# Patient Record
Sex: FEMALE | Race: WHITE | Employment: UNEMPLOYED | URBAN - NONMETROPOLITAN AREA
[De-identification: names, ages, dates, MRNs, and addresses within clinical notes are randomized per-mention and may not be internally consistent; named-entity substitution may affect disease eponyms.]

---

## 2017-01-16 ENCOUNTER — OFFICE VISIT (OUTPATIENT)
Dept: OTHER | Age: 22
End: 2017-01-16

## 2017-01-16 VITALS
WEIGHT: 157 LBS | DIASTOLIC BLOOD PRESSURE: 62 MMHG | BODY MASS INDEX: 24.64 KG/M2 | HEART RATE: 72 BPM | SYSTOLIC BLOOD PRESSURE: 112 MMHG | HEIGHT: 67 IN

## 2017-01-16 DIAGNOSIS — E10.8 TYPE 1 DIABETES MELLITUS WITH COMPLICATION, WITH LONG-TERM CURRENT USE OF INSULIN (HCC): Primary | ICD-10-CM

## 2017-01-16 LAB — HBA1C MFR BLD: 8 % (ref 4.3–5.7)

## 2017-01-16 PROCEDURE — 83036 HEMOGLOBIN GLYCOSYLATED A1C: CPT | Performed by: REGISTERED NURSE

## 2017-01-16 PROCEDURE — 99211 OFF/OP EST MAY X REQ PHY/QHP: CPT | Performed by: REGISTERED NURSE

## 2017-02-15 ENCOUNTER — TELEPHONE (OUTPATIENT)
Dept: OTHER | Age: 22
End: 2017-02-15

## 2017-02-22 ENCOUNTER — OFFICE VISIT (OUTPATIENT)
Dept: ENDOCRINOLOGY | Age: 22
End: 2017-02-22

## 2017-02-22 VITALS
BODY MASS INDEX: 24.4 KG/M2 | HEART RATE: 81 BPM | RESPIRATION RATE: 18 BRPM | HEIGHT: 67 IN | DIASTOLIC BLOOD PRESSURE: 58 MMHG | WEIGHT: 155.5 LBS | SYSTOLIC BLOOD PRESSURE: 121 MMHG

## 2017-02-22 DIAGNOSIS — E10.65 TYPE 1 DIABETES MELLITUS WITH HYPERGLYCEMIA (HCC): Primary | ICD-10-CM

## 2017-02-22 PROCEDURE — 99214 OFFICE O/P EST MOD 30 MIN: CPT | Performed by: CLINICAL NURSE SPECIALIST

## 2017-02-22 ASSESSMENT — ENCOUNTER SYMPTOMS
NAUSEA: 0
COUGH: 0
WHEEZING: 0
CONSTIPATION: 0
CHEST TIGHTNESS: 0
VOICE CHANGE: 0
ABDOMINAL PAIN: 0
SHORTNESS OF BREATH: 0
EYE REDNESS: 0
DIARRHEA: 0

## 2017-04-04 RX ORDER — GLUCOSAM/CHON-MSM1/C/MANG/BOSW 500-416.6
TABLET ORAL
Qty: 200 EACH | Refills: 5 | Status: SHIPPED | OUTPATIENT
Start: 2017-04-04 | End: 2017-07-31 | Stop reason: CLARIF

## 2017-04-19 ENCOUNTER — TELEPHONE (OUTPATIENT)
Dept: ENDOCRINOLOGY | Age: 22
End: 2017-04-19

## 2017-04-24 ENCOUNTER — OFFICE VISIT (OUTPATIENT)
Dept: OTHER | Age: 22
End: 2017-04-24

## 2017-04-24 VITALS — WEIGHT: 154 LBS | BODY MASS INDEX: 24.17 KG/M2

## 2017-04-24 DIAGNOSIS — E10.9 TYPE 1 DIABETES MELLITUS WITHOUT COMPLICATION (HCC): Primary | ICD-10-CM

## 2017-05-18 ENCOUNTER — OFFICE VISIT (OUTPATIENT)
Dept: ENDOCRINOLOGY | Age: 22
End: 2017-05-18

## 2017-05-18 VITALS
HEART RATE: 84 BPM | WEIGHT: 152.5 LBS | HEIGHT: 67 IN | RESPIRATION RATE: 18 BRPM | DIASTOLIC BLOOD PRESSURE: 68 MMHG | BODY MASS INDEX: 23.93 KG/M2 | SYSTOLIC BLOOD PRESSURE: 133 MMHG

## 2017-05-18 DIAGNOSIS — E10.65 TYPE 1 DIABETES MELLITUS WITH HYPERGLYCEMIA (HCC): Primary | ICD-10-CM

## 2017-05-18 PROCEDURE — 99214 OFFICE O/P EST MOD 30 MIN: CPT | Performed by: CLINICAL NURSE SPECIALIST

## 2017-05-18 ASSESSMENT — ENCOUNTER SYMPTOMS
SHORTNESS OF BREATH: 0
NAUSEA: 0
CHEST TIGHTNESS: 0
DIARRHEA: 0
CONSTIPATION: 0
ABDOMINAL PAIN: 0
COUGH: 0
VOICE CHANGE: 0
WHEEZING: 0
EYE REDNESS: 0

## 2017-06-26 ENCOUNTER — TELEPHONE (OUTPATIENT)
Dept: OTHER | Age: 22
End: 2017-06-26

## 2017-07-24 RX ORDER — LISINOPRIL 2.5 MG/1
TABLET ORAL
Qty: 30 TABLET | Refills: 3 | Status: SHIPPED | OUTPATIENT
Start: 2017-07-24 | End: 2017-09-11 | Stop reason: SDUPTHER

## 2017-07-31 RX ORDER — LANCETS 30 GAUGE
EACH MISCELLANEOUS
Qty: 200 EACH | Refills: 3 | Status: SHIPPED | OUTPATIENT
Start: 2017-07-31 | End: 2018-01-22 | Stop reason: SDUPTHER

## 2017-07-31 RX ORDER — BLOOD-GLUCOSE METER
1 EACH MISCELLANEOUS DAILY
Qty: 1 KIT | Refills: 0 | Status: SHIPPED | OUTPATIENT
Start: 2017-07-31 | End: 2022-03-07

## 2017-08-07 ENCOUNTER — HOSPITAL ENCOUNTER (OUTPATIENT)
Dept: PHARMACY | Age: 22
Setting detail: THERAPIES SERIES
Discharge: HOME | End: 2017-08-07
Payer: MEDICARE

## 2017-08-07 VITALS — BODY MASS INDEX: 23.54 KG/M2 | WEIGHT: 150 LBS

## 2017-08-07 PROCEDURE — G0108 DIAB MANAGE TRN  PER INDIV: HCPCS | Performed by: REGISTERED NURSE

## 2017-08-29 ENCOUNTER — HOSPITAL ENCOUNTER (OUTPATIENT)
Age: 22
Discharge: HOME | End: 2017-08-29
Payer: MEDICARE

## 2017-08-29 DIAGNOSIS — E10.65 TYPE 1 DIABETES MELLITUS WITH HYPERGLYCEMIA (HCC): ICD-10-CM

## 2017-08-29 LAB
AVERAGE GLUCOSE: 162 MG/DL (ref 70–126)
HBA1C MFR BLD: 7.4 % (ref 4.4–6.4)

## 2017-08-29 PROCEDURE — 36415 COLL VENOUS BLD VENIPUNCTURE: CPT

## 2017-08-29 PROCEDURE — 83036 HEMOGLOBIN GLYCOSYLATED A1C: CPT

## 2017-08-30 ENCOUNTER — OFFICE VISIT (OUTPATIENT)
Dept: ENDOCRINOLOGY | Age: 22
End: 2017-08-30
Payer: MEDICARE

## 2017-08-30 VITALS
DIASTOLIC BLOOD PRESSURE: 77 MMHG | HEART RATE: 101 BPM | SYSTOLIC BLOOD PRESSURE: 137 MMHG | BODY MASS INDEX: 23.62 KG/M2 | HEIGHT: 67 IN | WEIGHT: 150.5 LBS | RESPIRATION RATE: 18 BRPM

## 2017-08-30 DIAGNOSIS — E10.8 TYPE 1 DIABETES MELLITUS WITH COMPLICATION, WITH LONG-TERM CURRENT USE OF INSULIN (HCC): Primary | ICD-10-CM

## 2017-08-30 DIAGNOSIS — I10 ESSENTIAL HYPERTENSION: ICD-10-CM

## 2017-08-30 PROCEDURE — 3046F HEMOGLOBIN A1C LEVEL >9.0%: CPT | Performed by: INTERNAL MEDICINE

## 2017-08-30 PROCEDURE — 99213 OFFICE O/P EST LOW 20 MIN: CPT | Performed by: INTERNAL MEDICINE

## 2017-08-30 PROCEDURE — 1036F TOBACCO NON-USER: CPT | Performed by: INTERNAL MEDICINE

## 2017-08-30 PROCEDURE — G8420 CALC BMI NORM PARAMETERS: HCPCS | Performed by: INTERNAL MEDICINE

## 2017-08-30 PROCEDURE — G8427 DOCREV CUR MEDS BY ELIG CLIN: HCPCS | Performed by: INTERNAL MEDICINE

## 2017-08-30 RX ORDER — OXYBUTYNIN CHLORIDE 5 MG/1
5 TABLET, EXTENDED RELEASE ORAL DAILY
COMMUNITY
End: 2018-11-20 | Stop reason: DRUGHIGH

## 2017-09-11 RX ORDER — LISINOPRIL 2.5 MG/1
TABLET ORAL
Qty: 90 TABLET | Refills: 1 | Status: SHIPPED | OUTPATIENT
Start: 2017-09-11 | End: 2018-05-21

## 2017-09-15 DIAGNOSIS — E10.8 TYPE 1 DIABETES MELLITUS WITH COMPLICATION, WITH LONG-TERM CURRENT USE OF INSULIN (HCC): Primary | ICD-10-CM

## 2017-10-02 ENCOUNTER — TELEPHONE (OUTPATIENT)
Dept: ENDOCRINOLOGY | Age: 22
End: 2017-10-02

## 2017-10-02 NOTE — TELEPHONE ENCOUNTER
No change in insulin at this time with variable readings throughout day some in goal others above.  Work on consistency of food choice, meal times, activity BS goal 95 to 140

## 2017-10-04 NOTE — TELEPHONE ENCOUNTER
Left detailed message, per HIPAA, of Erika Meals recommendations. Advised to call our office with any questions or concerns.

## 2018-01-03 RX ORDER — INSULIN GLARGINE 100 [IU]/ML
INJECTION, SOLUTION SUBCUTANEOUS
Qty: 6 PEN | Refills: 3 | Status: SHIPPED | OUTPATIENT
Start: 2018-01-03 | End: 2018-05-15 | Stop reason: DRUGHIGH

## 2018-01-23 ENCOUNTER — HOSPITAL ENCOUNTER (OUTPATIENT)
Dept: PHARMACY | Age: 23
Setting detail: THERAPIES SERIES
Discharge: HOME | End: 2018-01-23
Payer: MEDICARE

## 2018-01-23 VITALS — WEIGHT: 149.13 LBS | BODY MASS INDEX: 23.41 KG/M2

## 2018-01-23 PROCEDURE — G0108 DIAB MANAGE TRN  PER INDIV: HCPCS | Performed by: REGISTERED NURSE

## 2018-01-23 RX ORDER — GLUCOSAM/CHON-MSM1/C/MANG/BOSW 500-416.6
TABLET ORAL
Qty: 200 EACH | Refills: 5 | Status: SHIPPED | OUTPATIENT
Start: 2018-01-23 | End: 2022-05-31

## 2018-01-23 NOTE — PROGRESS NOTES
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 She often sees a lower BS at 12mn check. She does not treat unless <100mg. FBS last 2-3 weeks have been consistently >200mg. Discussed options based on pattern management. Current nighttime basaglar is being taken at 730p, will try taking it later at 10p and if needed, have only a protein snack at bedtime. Basal needs appear to be not met after 3a. Without increasing total basal will borrow 2units from am dosie to be given at 10p. Mom to call update to CDE. Reviewed meal plan and made notes above for varied routines. Reviewed teaching rt prevention/treatment of hypoglycemia, BS goals, and prevention of sick day. DSME PLAN:   Discussed general issues about diabetes pathophysiology and management. Counseling at today's visit: focused on the need to adhere to the prescribed ADA diet, reminded to check sugars regularly and to bring readings in at the time of the next visit, discussed sick day management and discussed management of hypoglycemic episodes. Meter download, medications, PMH and nursing assessment reviewed. Juan Giles states She is willing to participate in this plan of care and verbalized understanding of all instructions provided. Teach back used to verify comprehension. Total time involved in direct patient education: 60 minutes. 1.  Lows are happening 12mn due to exercise at work. 2.  [place] is a correct amount, but needs to be split to beeter meet her daytime BS needs. AM basaglar will decrease to 22units (was 24)  And 10p basaglar increased to 8units (was 6). 3.  Move BS check to 1a. 4.  Have a protein snack, if needed, when she comes home from work depending how active you were, if you had a snack at work, and if you do a BS and it is lower.   5.  Give Lakesha a call first week of February with an update at [phone]  RN 4-6 months

## 2018-03-05 ENCOUNTER — TELEPHONE (OUTPATIENT)
Dept: ENDOCRINOLOGY | Age: 23
End: 2018-03-05

## 2018-03-05 DIAGNOSIS — E10.8 TYPE 1 DIABETES MELLITUS WITH COMPLICATION, WITH LONG-TERM CURRENT USE OF INSULIN (HCC): Primary | ICD-10-CM

## 2018-03-06 ENCOUNTER — HOSPITAL ENCOUNTER (OUTPATIENT)
Age: 23
Discharge: HOME | End: 2018-03-06
Payer: MEDICARE

## 2018-03-06 DIAGNOSIS — E10.8 TYPE 1 DIABETES MELLITUS WITH COMPLICATION, WITH LONG-TERM CURRENT USE OF INSULIN (HCC): ICD-10-CM

## 2018-03-06 LAB
AVERAGE GLUCOSE: 174 MG/DL (ref 70–126)
HBA1C MFR BLD: 7.8 % (ref 4.4–6.4)

## 2018-03-06 PROCEDURE — 36415 COLL VENOUS BLD VENIPUNCTURE: CPT

## 2018-03-06 PROCEDURE — 83036 HEMOGLOBIN GLYCOSYLATED A1C: CPT

## 2018-03-07 ENCOUNTER — OFFICE VISIT (OUTPATIENT)
Dept: ENDOCRINOLOGY | Age: 23
End: 2018-03-07
Payer: MEDICARE

## 2018-03-07 VITALS
SYSTOLIC BLOOD PRESSURE: 122 MMHG | HEART RATE: 98 BPM | DIASTOLIC BLOOD PRESSURE: 70 MMHG | RESPIRATION RATE: 16 BRPM | WEIGHT: 150 LBS | HEIGHT: 67 IN | BODY MASS INDEX: 23.54 KG/M2

## 2018-03-07 DIAGNOSIS — E10.9 TYPE 1 DIABETES MELLITUS WITHOUT COMPLICATIONS (HCC): Primary | ICD-10-CM

## 2018-03-07 PROCEDURE — 1036F TOBACCO NON-USER: CPT | Performed by: CLINICAL NURSE SPECIALIST

## 2018-03-07 PROCEDURE — 3045F PR MOST RECENT HEMOGLOBIN A1C LEVEL 7.0-9.0%: CPT | Performed by: CLINICAL NURSE SPECIALIST

## 2018-03-07 PROCEDURE — G8427 DOCREV CUR MEDS BY ELIG CLIN: HCPCS | Performed by: CLINICAL NURSE SPECIALIST

## 2018-03-07 PROCEDURE — 99214 OFFICE O/P EST MOD 30 MIN: CPT | Performed by: CLINICAL NURSE SPECIALIST

## 2018-03-07 PROCEDURE — G8420 CALC BMI NORM PARAMETERS: HCPCS | Performed by: CLINICAL NURSE SPECIALIST

## 2018-03-07 PROCEDURE — G8484 FLU IMMUNIZE NO ADMIN: HCPCS | Performed by: CLINICAL NURSE SPECIALIST

## 2018-03-07 ASSESSMENT — ENCOUNTER SYMPTOMS
SHORTNESS OF BREATH: 0
ABDOMINAL PAIN: 0
WHEEZING: 0
DIARRHEA: 0
EYE REDNESS: 0
CHEST TIGHTNESS: 0
VOICE CHANGE: 0
NAUSEA: 0
CONSTIPATION: 0
COUGH: 0

## 2018-03-07 NOTE — PROGRESS NOTES
[facility] PROFESSIONAL SERVS  ENDOCRINE DIABETES METABOLISM ASHU  [address]  [address]  [address] [facility]  Dept: [phone]  Dept Fax: [phone]  Loc: [phone]    Juan Giles is a 25 y.o. female who presents today for follow-up for type 1 diabetes mellitus. She was diagnosed 11 years ago. Last visit 8/2017 with DR. Short patient is maintained on basal bolus insulin. checking BS 5 times daily with many readings in goal throughout the day. Some lows post meal in 50's throughout day. 3/2018 7.8 % this month  She does recognize lows with sweating, shaking but not until 50's she states. Readings are much improved from previous visits. Patient with Asperger's syndrome accompanied by her mother. She lives with her mother who helps her manage her meal consistency and times as well as glycemic monitoring and goals. Patient does check BS and takes her own insulin. Weight is stable, BMI 23.4. Eye exam within past year, denies current visual disturbance. Denies foot problems, no numbness, tingling, rash or lesion. Chief Complaint   Patient presents with    Diabetes     type 2    Foot Problem     denies any     Eye Exam     08/31/17    Hypertension    Discuss Labs     03/06/18       HPI:     HPI    Past Medical History:   Diagnosis Date    ADHD (attention deficit hyperactivity disorder)     Asperger's disorder     Type I (juvenile type) diabetes mellitus without mention of complication, uncontrolled 8/16/2012      History reviewed. No pertinent surgical history.   Family History   Problem Relation Age of Onset    Asthma Father     Depression Maternal Uncle     Diabetes Maternal Grandfather     Cancer Paternal Grandfather     Diabetes Other      Social History   Substance Use Topics    Smoking status: Never Smoker    Smokeless tobacco: Never Used    Alcohol use No      Current Outpatient Prescriptions   Medication Sig Dispense Refill    Insulin Pen Needle (RELION PEN NEEDLES) 31G X 6 MM MISC Use to inject insulin 5 times per day. Diagnosis: E10.8 150 each 5    TRUEPLUS LANCETS 30G MISC Use to check blood glucose level 6 times per day. Diagnosis: E10.8 200 each 5    BASAGLAR KWIKPEN 100 UNIT/ML injection pen INJECT 24 UNITS EVERY MORNING AND 6 UNITS EVERY NIGHT (Patient taking differently: INJECT 22 UNITS EVERY MORNING AND 8 UNITS EVERY NIGHT) 6 pen 3    Insulin Pen Needle (RELION PEN NEEDLES) 31G X 6 MM MISC 1 each by Does not apply route 5 times daily 500 each 3    lisinopril (PRINIVIL;ZESTRIL) 2.5 MG tablet TAKE ONE TABLET BY MOUTH ONCE DAILY 90 tablet 1    oxybutynin (DITROPAN XL) 5 MG extended release tablet Take 5 mg by mouth daily      glucose blood VI test strips (GLUCOSE METER TEST) strip Check BS 6 times a day(provide what is covered by pt ins) DxE10.65 100 each 3    Blood Glucose Monitoring Suppl ([employer] BLOOD GLUCOSE METER) w/Device KIT 1 Device by Does not apply route daily Provide what is covered by pt ins DX E10.65 1 kit 0    insulin aspart (NOVOLOG FLEXPEN) 100 UNIT/ML injection pen Inject 1:6 with breakfast 1:5 with lunch and 1:8 with dinner. (max 40 units per day) (Patient taking differently: Inject 1:6 with breakfast 1:5 with lunch and 1:12 with dinner. (max 40 units per day)) 4 Pen 3    methylphenidate (CONCERTA) 27 MG CR tablet Take 27 mg by mouth every morning.  glucagon (GLUCAGON EMERGENCY) 1 MG injection USE AS ADVISED FOR LOW BLOOD SUGAR 1 kit 1     No current facility-administered medications for this visit. No Known Allergies    Subjective:      Review of Systems   Constitutional: Negative for appetite change, chills, fatigue and unexpected weight change. HENT: Negative for hearing loss, tinnitus and voice change. Eyes: Negative for redness and visual disturbance. Respiratory: Negative for cough, chest tightness, shortness of breath and wheezing. Cardiovascular: Negative for chest pain, palpitations and leg swelling.    Gastrointestinal: Negative for [x]   Normal Exam  Monofilament sensation   [x]   Normal         []   Abnormal   Pulses   [x]   Normal            []   Abnormal    Comments:  normal  No open lesions    Assessment:         Hypertension    /70 (Site: Right Arm, Position: Sitting, Cuff Size: Medium Adult)   Pulse 98   Resp 16   Ht 5' 6.93\" (1.7 m)   Wt 150 lb (68 kg)   BMI 23.54 kg/m²   1. Type 1 diabetes mellitus without complications (HCC) - no change in insulin at this time - many readings in goal - improved readings on current download compared to previous visits. Some post meal lows - she does feel symptoms and plans to obtain continuous sensor she states - discussed strategies to avoid/limit lows. - continue with educator with knowledge deficit requires guidance/monitoring/advice as insurance allows. A1c 7.8% needs improvements with avoidance of hypoglycemia as discussed.  On ACE  5/2017 BUN 16, creat 0.7, GFR >90, liver panel unremarkable, chol 125, trig 39, HDL 60, LDL 57  CMP, lipids, A1c, thyroid function, urine malb/creat on follow up       Greater than 50% of visit discussing glycemic management, diagnoses as above, reviewing/ordering labs, addressing medication, answering questions    Plan:      Check blood sugar 4 to 6 times daily - before meals, bedtime - for symptoms of lows  Blood sugar goal 95 to 140  Call in blood sugar readings between visits if not in goal/lows/concerns  Continue with diabetes educator     Lantus 22 units in AM and INCREASE 8 units in PM      Novolog 1:6 units depending on anticipated activity with breakfast,  Novolog 1:8 units depending on anticipated activity with lunch  Novolog 1:8 to 1:12 units depending on anticipated activity with supper     Plus sliding scale     Insulin Correction Sliding Scale     Glucose Level:    mg/dl = No extra Insulin   151-200 mg/dl = 1 Units   201-250 mg/dl = 2 Units   251-300 mg/dl = 3 units  301-350 mg/dl = 4 units   351-400 mg/dl = 5 units   Above 400 mg/dl = 6  units and call MD     5 MINUTES BEFORE BREAKFAST, LUNCH, DINNER  ]  Meal time insulin starts to work in about 5 minutes and lasts about 4 to 5 hours in your system. If lows occur during that time, too much insulin was taken for amount of food eaten and/or you were very active    A1c 7.8% (average Blood sugar 174)     Follow up in 3 months    No Follow-up on file.        Electronically signed by POWER Hyde on 3/7/2018 at 11:34 AM

## 2018-03-07 NOTE — PATIENT INSTRUCTIONS
Check blood sugar 4 to 6 times daily - before meals, bedtime - for symptoms of lows  Blood sugar goal 95 to 140  Call in blood sugar readings between visits if not in goal/lows/concerns  Continue with diabetes educator     Lantus 22 units in AM and INCREASE 8 units in PM      Novolog 1:6 units depending on anticipated activity with breakfast,  Novolog 1:8 units depending on anticipated activity with lunch  Novolog 1:8 to 1:12 units depending on anticipated activity with supper     Plus sliding scale     Insulin Correction Sliding Scale     Glucose Level:    mg/dl = No extra Insulin   151-200 mg/dl = 1 Units   201-250 mg/dl = 2 Units   251-300 mg/dl = 3 units  301-350 mg/dl = 4 units   351-400 mg/dl = 5 units   Above 400 mg/dl = 6  units and call MD     5 MINUTES BEFORE BREAKFAST, LUNCH, DINNER  ]  Meal time insulin starts to work in about 5 minutes and lasts about 4 to 5 hours in your system.  If lows occur during that time, too much insulin was taken for amount of food eaten and/or you were very active    A1c 7.8% (average Blood sugar 174)     Follow up in 3 months

## 2018-04-05 ENCOUNTER — TELEPHONE (OUTPATIENT)
Dept: ENDOCRINOLOGY | Age: 23
End: 2018-04-05

## 2018-04-05 DIAGNOSIS — E10.8 TYPE 1 DIABETES MELLITUS WITH COMPLICATION (HCC): Primary | ICD-10-CM

## 2018-05-10 ENCOUNTER — HOSPITAL ENCOUNTER (OUTPATIENT)
Age: 23
Discharge: HOME | End: 2018-05-10
Payer: MEDICARE

## 2018-05-10 DIAGNOSIS — E10.9 TYPE 1 DIABETES MELLITUS WITHOUT COMPLICATIONS (HCC): ICD-10-CM

## 2018-05-10 LAB
ALBUMIN SERPL-MCNC: 4.2 G/DL (ref 3.5–5.1)
ALP BLD-CCNC: 56 U/L (ref 38–126)
ALT SERPL-CCNC: 11 U/L (ref 11–66)
ANION GAP SERPL CALCULATED.3IONS-SCNC: 11 MEQ/L (ref 8–16)
AST SERPL-CCNC: 11 U/L (ref 5–40)
AVERAGE GLUCOSE: 153 MG/DL (ref 70–126)
BILIRUB SERPL-MCNC: 0.6 MG/DL (ref 0.3–1.2)
BUN BLDV-MCNC: 13 MG/DL (ref 7–22)
CALCIUM SERPL-MCNC: 9 MG/DL (ref 8.5–10.5)
CHLORIDE BLD-SCNC: 100 MEQ/L (ref 98–111)
CHOLESTEROL, TOTAL: 111 MG/DL (ref 100–199)
CO2: 24 MEQ/L (ref 23–33)
CREAT SERPL-MCNC: 0.7 MG/DL (ref 0.4–1.2)
CREATININE, URINE: 106.9 MG/DL
GFR SERPL CREATININE-BSD FRML MDRD: > 90 ML/MIN/1.73M2
GLUCOSE BLD-MCNC: 310 MG/DL (ref 70–108)
HBA1C MFR BLD: 7.1 % (ref 4.4–6.4)
HDLC SERPL-MCNC: 66 MG/DL
LDL CHOLESTEROL CALCULATED: 39 MG/DL
MICROALBUMIN UR-MCNC: < 1.2 MG/DL
MICROALBUMIN/CREAT UR-RTO: 11 MG/G (ref 0–30)
POTASSIUM SERPL-SCNC: 4.9 MEQ/L (ref 3.5–5.2)
SODIUM BLD-SCNC: 135 MEQ/L (ref 135–145)
T4 FREE: 1.2 NG/DL (ref 0.93–1.76)
TOTAL PROTEIN: 6.9 G/DL (ref 6.1–8)
TRIGL SERPL-MCNC: 30 MG/DL (ref 0–199)
TSH SERPL DL<=0.05 MIU/L-ACNC: 1.79 UIU/ML (ref 0.4–4.2)

## 2018-05-10 PROCEDURE — 84439 ASSAY OF FREE THYROXINE: CPT

## 2018-05-10 PROCEDURE — 80053 COMPREHEN METABOLIC PANEL: CPT

## 2018-05-10 PROCEDURE — 84443 ASSAY THYROID STIM HORMONE: CPT

## 2018-05-10 PROCEDURE — 82043 UR ALBUMIN QUANTITATIVE: CPT

## 2018-05-10 PROCEDURE — 80061 LIPID PANEL: CPT

## 2018-05-10 PROCEDURE — 36415 COLL VENOUS BLD VENIPUNCTURE: CPT

## 2018-05-10 PROCEDURE — 83036 HEMOGLOBIN GLYCOSYLATED A1C: CPT

## 2018-05-15 ENCOUNTER — OFFICE VISIT (OUTPATIENT)
Dept: INTERNAL MEDICINE CLINIC | Age: 23
End: 2018-05-15
Payer: MEDICARE

## 2018-05-15 VITALS — WEIGHT: 156.13 LBS | BODY MASS INDEX: 24.5 KG/M2

## 2018-05-15 DIAGNOSIS — E10.8 TYPE 1 DIABETES MELLITUS WITH COMPLICATION, WITH LONG-TERM CURRENT USE OF INSULIN (HCC): ICD-10-CM

## 2018-05-15 PROCEDURE — 99999 PR OFFICE/OUTPT VISIT,PROCEDURE ONLY: CPT | Performed by: NURSE PRACTITIONER

## 2018-05-15 PROCEDURE — G0108 DIAB MANAGE TRN  PER INDIV: HCPCS | Performed by: NURSE PRACTITIONER

## 2018-05-16 RX ORDER — LANCETS 33 GAUGE
EACH MISCELLANEOUS
Qty: 200 EACH | Refills: 2 | Status: SHIPPED | OUTPATIENT
Start: 2018-05-16 | End: 2018-06-29 | Stop reason: ALTCHOICE

## 2018-05-21 RX ORDER — LISINOPRIL 2.5 MG/1
TABLET ORAL
Qty: 30 TABLET | Refills: 3 | Status: SHIPPED | OUTPATIENT
Start: 2018-05-21 | End: 2018-09-24 | Stop reason: SDUPTHER

## 2018-06-18 ENCOUNTER — OFFICE VISIT (OUTPATIENT)
Dept: INTERNAL MEDICINE CLINIC | Age: 23
End: 2018-06-18
Payer: MEDICARE

## 2018-06-18 VITALS
RESPIRATION RATE: 12 BRPM | DIASTOLIC BLOOD PRESSURE: 72 MMHG | SYSTOLIC BLOOD PRESSURE: 126 MMHG | HEIGHT: 67 IN | WEIGHT: 153.5 LBS | HEART RATE: 104 BPM | BODY MASS INDEX: 24.09 KG/M2

## 2018-06-18 DIAGNOSIS — E10.9 TYPE 1 DIABETES MELLITUS WITHOUT COMPLICATIONS (HCC): Primary | ICD-10-CM

## 2018-06-18 PROCEDURE — 1036F TOBACCO NON-USER: CPT | Performed by: CLINICAL NURSE SPECIALIST

## 2018-06-18 PROCEDURE — G8420 CALC BMI NORM PARAMETERS: HCPCS | Performed by: CLINICAL NURSE SPECIALIST

## 2018-06-18 PROCEDURE — 2022F DILAT RTA XM EVC RTNOPTHY: CPT | Performed by: CLINICAL NURSE SPECIALIST

## 2018-06-18 PROCEDURE — 3045F PR MOST RECENT HEMOGLOBIN A1C LEVEL 7.0-9.0%: CPT | Performed by: CLINICAL NURSE SPECIALIST

## 2018-06-18 PROCEDURE — 99214 OFFICE O/P EST MOD 30 MIN: CPT | Performed by: CLINICAL NURSE SPECIALIST

## 2018-06-18 PROCEDURE — G8427 DOCREV CUR MEDS BY ELIG CLIN: HCPCS | Performed by: CLINICAL NURSE SPECIALIST

## 2018-06-18 ASSESSMENT — ENCOUNTER SYMPTOMS
CONSTIPATION: 0
ABDOMINAL PAIN: 0
WHEEZING: 0
VOICE CHANGE: 0
COUGH: 0
DIARRHEA: 0
EYE REDNESS: 0
NAUSEA: 0
SHORTNESS OF BREATH: 0
CHEST TIGHTNESS: 0

## 2018-06-18 ASSESSMENT — PATIENT HEALTH QUESTIONNAIRE - PHQ9
1. LITTLE INTEREST OR PLEASURE IN DOING THINGS: 0
2. FEELING DOWN, DEPRESSED OR HOPELESS: 0
SUM OF ALL RESPONSES TO PHQ9 QUESTIONS 1 & 2: 0
SUM OF ALL RESPONSES TO PHQ QUESTIONS 1-9: 0

## 2018-06-29 RX ORDER — LANCETS 33 GAUGE
EACH MISCELLANEOUS
Qty: 150 EACH | Refills: 3 | Status: SHIPPED | OUTPATIENT
Start: 2018-06-29 | End: 2018-11-08 | Stop reason: SDUPTHER

## 2018-08-13 ENCOUNTER — OFFICE VISIT (OUTPATIENT)
Dept: INTERNAL MEDICINE CLINIC | Age: 23
End: 2018-08-13
Payer: MEDICARE

## 2018-08-13 VITALS — BODY MASS INDEX: 24.82 KG/M2 | WEIGHT: 158.13 LBS

## 2018-08-13 DIAGNOSIS — E10.8 TYPE 1 DIABETES MELLITUS WITH COMPLICATION, WITH LONG-TERM CURRENT USE OF INSULIN (HCC): ICD-10-CM

## 2018-08-13 PROCEDURE — 99999 PR OFFICE/OUTPT VISIT,PROCEDURE ONLY: CPT | Performed by: NURSE PRACTITIONER

## 2018-08-13 PROCEDURE — G0108 DIAB MANAGE TRN  PER INDIV: HCPCS | Performed by: NURSE PRACTITIONER

## 2018-08-13 RX ORDER — PEN NEEDLE, DIABETIC 32GX 5/32"
NEEDLE, DISPOSABLE MISCELLANEOUS
Qty: 150 EACH | Refills: 5 | Status: SHIPPED | OUTPATIENT
Start: 2018-08-13 | End: 2019-01-27 | Stop reason: SDUPTHER

## 2018-08-13 NOTE — PROGRESS NOTES
[facility]  [address] [facility] [address]  [phone] (phone)  [phone] (fax)    Patient ID: Kvng Roa [id number]  Referring Provider: Dr. Rogelio Herring     Patient's name and  were verified. Subjective:    She presents for Her follow-up diabetic visit. She has type 1 diabetes mellitus. Home regimen includes: insulin She is compliant most of the time. Assessment:     Lab Results   Component Value Date    LABA1C 7.1 05/10/2018    BUN 13 05/10/2018    CREATININE 0.7 05/10/2018     Vitals:    18 1009   Weight: 158 lb 2 oz (71.7 kg)     Wt Readings from Last 3 Encounters:   18 158 lb 2 oz (71.7 kg)   18 153 lb 8 oz (69.6 kg)   05/15/18 156 lb 2 oz (70.8 kg)     Ht Readings from Last 3 Encounters:   18 5' 6.93\" (1.7 m)   18 5' 6.93\" (1.7 m)   17 5' 6.93\" (1.7 m)       Glucose at fbs hrs PPD today resulted at 293mg/dl  Current monitoring regimen: home blood tests - 4 times daily  Home blood sugar trends: high fbs  Any episodes of hypoglycemia? After above normal activity. No longer working at [employer]  Previous visit with dietician: yes - requesting an eval for weight gain  Current diet: on average, 3 meals per day and bedtime snack  Current exercise: walking and dancing 10mins couple times per week  Eye exam current (within one year): yes. 2017  Any history of foot problems? no  Last foot exam: deferred  Immunizations up to date: na  Taking ASA:  No - If not why? Appropriate for use of MyChart Glucose Grid:  No    Focus:     Meeta's meter upload is showing higher fbs that are consistently . 200mg with a rise from 12mn to wake up. Claude Morataya is not working at [employer] and current activity has nt replaced that walking she was doing at work. Discussed options. Not utilizing indoor walking option. Doing some intermittent dancing. Has gained about 8lbs. Probably experiencing insulin resistance.  Discussed dietary, exercise, and basic pattern management for BS control. She is out of her growing mode because she is no longer a child. Mother continues to assist at home, but Milka Bowie has definite ideas on her diabetes. She does not like to be poked, nor does she like to have low BS. Discussed options for prevention with exercise if having more than the occasional low. Due for eye, a1c, and foot exam. Agreed to plan. DSME PLAN:   Discussed general issues about diabetes pathophysiology and management. Counseling at today's visit: discussed the need for weight loss, focused on the need for regular aerobic exercise, reminded to check sugars regularly and to bring readings in at the time of the next visit and discussed management of hypoglycemic episodes. Increased dose of insulin: basaglar using pattern management. Meter download, medications, PMH and nursing assessment reviewed. Hemanth Muller states She is willing to participate in this plan of care and verbalized understanding of all instructions provided. Teach back used to verify comprehension. Total time involved in direct patient education: 60 minutes. 1.  1500 calorie diet (150gms of carb) might be an option for weight loss. 2.  Keep a log for Kalyani the dietician before her visit. 3.  Try breaking up dance into 2 sessions. If needed a 1/2 fruit could be an option, right after. The problem is without checking the BS you don't know if you need it or not. 4.   Plan for a1c at Dr. Crystal Bee visit. 5.  Eye and foot exam are due. 6.  High nighttime through fasting blood sugar continue. May titrate basaglar by 1unit at night to address this problem.   RD/RN every 3-6 months

## 2018-08-20 ENCOUNTER — OFFICE VISIT (OUTPATIENT)
Dept: INTERNAL MEDICINE CLINIC | Age: 23
End: 2018-08-20
Payer: MEDICARE

## 2018-08-20 VITALS
HEIGHT: 67 IN | WEIGHT: 158 LBS | SYSTOLIC BLOOD PRESSURE: 118 MMHG | BODY MASS INDEX: 24.8 KG/M2 | RESPIRATION RATE: 16 BRPM | HEART RATE: 88 BPM | DIASTOLIC BLOOD PRESSURE: 68 MMHG

## 2018-08-20 DIAGNOSIS — E10.9 WELL CONTROLLED TYPE 1 DIABETES MELLITUS (HCC): Primary | ICD-10-CM

## 2018-08-20 LAB — HBA1C MFR BLD: 7.3 % (ref 4.3–5.7)

## 2018-08-20 PROCEDURE — 1036F TOBACCO NON-USER: CPT | Performed by: INTERNAL MEDICINE

## 2018-08-20 PROCEDURE — 99204 OFFICE O/P NEW MOD 45 MIN: CPT | Performed by: INTERNAL MEDICINE

## 2018-08-20 PROCEDURE — 83036 HEMOGLOBIN GLYCOSYLATED A1C: CPT | Performed by: INTERNAL MEDICINE

## 2018-08-20 PROCEDURE — G8427 DOCREV CUR MEDS BY ELIG CLIN: HCPCS | Performed by: INTERNAL MEDICINE

## 2018-08-20 PROCEDURE — 2022F DILAT RTA XM EVC RTNOPTHY: CPT | Performed by: INTERNAL MEDICINE

## 2018-08-20 PROCEDURE — 3045F PR MOST RECENT HEMOGLOBIN A1C LEVEL 7.0-9.0%: CPT | Performed by: INTERNAL MEDICINE

## 2018-08-20 PROCEDURE — G8420 CALC BMI NORM PARAMETERS: HCPCS | Performed by: INTERNAL MEDICINE

## 2018-08-20 RX ORDER — OXYBUTYNIN CHLORIDE 15 MG/1
TABLET, EXTENDED RELEASE ORAL
COMMUNITY
Start: 2018-05-21 | End: 2021-06-03

## 2018-08-20 NOTE — PROGRESS NOTES
[facility] PROFESSIONAL SERVS  PHYSICIANS [facility] [address], [address]  Dept: [phone]  Dept Fax: [phone]      Chief Complaint   Patient presents with    Diabetes     type 1,  before lunch, her meter. BS log scanned into media.  Foot Problem     pt denies any foot problems    Eye Problem     last eye exam 08/31/2017, will be scheduling another one soon.  Discuss Labs     A1c done in office, 7.3%     Patient presents for evaluation of IDDM. I have never seen the patient before. This patient is followed regularly by Dr. Carlie Dsouza. Patient is in the diabetic clinic and sees Geovanni Camargo. She also used to see Dr. Asa Watkins and Anita Harper. She's been diabetic since 2006 at which time insulin was started. She is on basal bolus insulin  Sugars generally run below 200  No eye or foot problems  Past Medical History:   Diagnosis Date    ADHD (attention deficit hyperactivity disorder)     Asperger's disorder     Type I (juvenile type) diabetes mellitus without mention of complication, uncontrolled 8/16/2012       Current Outpatient Prescriptions   Medication Sig Dispense Refill    oxybutynin (DITROPAN XL) 15 MG extended release tablet       RELION PEN NEEDLES 31G X 6 MM MISC USE TO INJECT INSULIN 5 TIMES DAILY 150 each 5    Insulin Glargine (BASAGLAR KWIKPEN SC) Inject into the skin 2 times daily 22units in am and 11units in pm      ONETOUCH DELICA LANCETS 57M MISC Check blood sugar 4 x daily (Dx: E10.9) 150 each 3    lisinopril (PRINIVIL;ZESTRIL) 2.5 MG tablet TAKE ONE TABLET BY MOUTH ONCE DAILY 30 tablet 3    Insulin Aspart (NOVOLOG FLEXPEN SC) Inject into the skin 3 times daily (before meals) Inject 1:6 with breakfast 1:8 with lunch and 1:8 with dinner. (max 40 units per day)      glucose blood VI test strips (FREESTYLE TEST STRIPS) strip 1 each by In Vitro route 4 times daily As needed.  150 each 3    TRUEPLUS LANCETS 30G MISC Use to check blood glucose level 6

## 2018-09-27 RX ORDER — LISINOPRIL 2.5 MG/1
TABLET ORAL
Qty: 30 TABLET | Refills: 3 | Status: SHIPPED | OUTPATIENT
Start: 2018-09-27 | End: 2019-01-07 | Stop reason: SDUPTHER

## 2018-11-09 RX ORDER — LANCETS 33 GAUGE
EACH MISCELLANEOUS
Qty: 150 EACH | Refills: 3 | Status: SHIPPED | OUTPATIENT
Start: 2018-11-09 | End: 2018-11-14 | Stop reason: SDUPTHER

## 2018-11-12 ENCOUNTER — OFFICE VISIT (OUTPATIENT)
Dept: INTERNAL MEDICINE CLINIC | Age: 23
End: 2018-11-12
Payer: MEDICARE

## 2018-11-12 VITALS — BODY MASS INDEX: 25.27 KG/M2 | WEIGHT: 161 LBS | HEIGHT: 67 IN

## 2018-11-12 DIAGNOSIS — E10.8 TYPE 1 DIABETES MELLITUS WITH COMPLICATION, WITH LONG-TERM CURRENT USE OF INSULIN (HCC): ICD-10-CM

## 2018-11-12 PROCEDURE — 97802 MEDICAL NUTRITION INDIV IN: CPT | Performed by: DIETITIAN, REGISTERED

## 2018-11-12 PROCEDURE — 99999 PR OFFICE/OUTPT VISIT,PROCEDURE ONLY: CPT | Performed by: DIETITIAN, REGISTERED

## 2018-11-12 NOTE — PROGRESS NOTES
hungry at breakfast or at lunch. She is most hungry at supper time. -Main Beverages: water   Exercise 3-4x/week: Either [facility] [address] and walk on the in door track usually can fit this in the afternoon or will do her Dance @ home. Pt keeps busy with Beading or sewing at home and gets involved with some volunteer activity. Plans to get a job in the future - pt use to work at "[employer].    -Impression of Dietary Intake: on average, 3 meals per day, on average, 3-4 fast food meals per week, on average, limited servings fruit per day, on average, limited servings vegetables per day, high fat/ cholesterol, lacks adequate hunger in the morning for breakfast and at lunch time, most hungry at supper time. .    Current Outpatient Prescriptions on File Prior to Visit   Medication Sig Dispense Refill    ONETOUCH DELICA LANCETS 61O MISC USE   TO CHECK GLUCOSE 4 TIMES DAILY 150 each 3    lisinopril (PRINIVIL;ZESTRIL) 2.5 MG tablet TAKE ONE TABLET BY MOUTH ONCE DAILY 30 tablet 3    insulin glargine (BASAGLAR KWIKPEN) 100 UNIT/ML injection pen Inject 22 units in am and 10 units in pm 5 pen 3    oxybutynin (DITROPAN XL) 15 MG extended release tablet       RELION PEN NEEDLES 31G X 6 MM MISC USE TO INJECT INSULIN 5 TIMES DAILY 150 each 5    Insulin Aspart (NOVOLOG FLEXPEN SC) Inject into the skin 3 times daily (before meals) Inject 1:6 with breakfast 1:8 with lunch and 1:8 with dinner. (max 40 units per day)      glucose blood VI test strips (FREESTYLE TEST STRIPS) strip 1 each by In Vitro route 4 times daily As needed. 150 each 3    TRUEPLUS LANCETS 30G MISC Use to check blood glucose level 6 times per day.  Diagnosis: E10.8 200 each 5    oxybutynin (DITROPAN XL) 5 MG extended release tablet Take 5 mg by mouth daily      Blood Glucose Monitoring Suppl ([employer]S BLOOD GLUCOSE METER) w/Device KIT 1 Device by Does not apply route daily Provide what is covered by pt ins DX E10.65 1 kit 0    methylphenidate (CONCERTA) 27 MG CR tablet Take 27 mg by mouth every morning.  glucagon (GLUCAGON EMERGENCY) 1 MG injection USE AS ADVISED FOR LOW BLOOD SUGAR 1 kit 1     No current facility-administered medications on file prior to visit. Vitals from current and previous visits:  Ht 5' 7\" (1.702 m)   Wt 161 lb (73 kg)   BMI 25.22 kg/m²     -Body mass index is 25.22 kg/m². 25-29.9 - Overweight.   -Weight goal: lose weight. Nutrition Diagnosis:   Food and nutrition-related knowledge deficit related to currently undergoing MNT as evidenced by Conditions associated with a diagnosis or treatment: Type I DB. Intervention:  -Impression: Pt is a Type I DB known to me from Pediatric clinic. Pt here with mom. Pt understands carb counting and reads labels and looks up restaurant foods so she knows how many carbs she is eating. They question the # of carbs in homemade prepared foods and casseroles.     -Instructed the patient on: Carbohydrate Counting, Healthy Choices When Dining Out, Meal Planning for Regular, Balanced Meals & Snacks and The Importance of Regular Physical Activity. How to count the carbs in mixed dishes such as casseroles 1 cup = 30 gms carbs. Including protein with evening snack and with Brkf.    -Handouts given for: carbohydrate counting guide booklet, 1500 calorie 5 day sample menu and fast food guidelines, combination food list.    Patient Instructions   1.)  Follow your new meal plan. 1500 calories/day. 30 gms @ Brkf,   2.)  When eating out - look for grilled items if possible. (Refer to handout)  3.)  Good job getting active. Can also do chair exercises or do an extra dance routine in the evening.  4.)  Check the combination foods list for accuracy in carb counting.  5.)  Bring a two week food log and your meter to every DB center appt.      -General Diet Recommendations: low fat, low cholesterol and balanced meal planning, carb counting combination foods.  -Nutrition prescription: 1500 - 1600

## 2018-11-14 DIAGNOSIS — E10.8 DM (DIABETES MELLITUS), TYPE 1 WITH COMPLICATIONS (HCC): Primary | ICD-10-CM

## 2018-11-15 RX ORDER — LANCETS 33 GAUGE
EACH MISCELLANEOUS
Qty: 150 EACH | Refills: 3 | Status: SHIPPED | OUTPATIENT
Start: 2018-11-15 | End: 2019-02-11 | Stop reason: SDUPTHER

## 2018-11-16 DIAGNOSIS — E10.8 TYPE 1 DIABETES MELLITUS WITH COMPLICATION (HCC): ICD-10-CM

## 2018-11-20 ENCOUNTER — OFFICE VISIT (OUTPATIENT)
Dept: INTERNAL MEDICINE CLINIC | Age: 23
End: 2018-11-20
Payer: MEDICARE

## 2018-11-20 VITALS
OXYGEN SATURATION: 99 % | BODY MASS INDEX: 25.27 KG/M2 | DIASTOLIC BLOOD PRESSURE: 70 MMHG | HEIGHT: 67 IN | HEART RATE: 88 BPM | WEIGHT: 161 LBS | SYSTOLIC BLOOD PRESSURE: 110 MMHG

## 2018-11-20 DIAGNOSIS — F84.5 ASPERGER SYNDROME: ICD-10-CM

## 2018-11-20 DIAGNOSIS — F90.9 ATTENTION DEFICIT HYPERACTIVITY DISORDER (ADHD), UNSPECIFIED ADHD TYPE: ICD-10-CM

## 2018-11-20 DIAGNOSIS — I10 ESSENTIAL HYPERTENSION: ICD-10-CM

## 2018-11-20 DIAGNOSIS — E10.8 TYPE 1 DIABETES MELLITUS WITH COMPLICATION, WITH LONG-TERM CURRENT USE OF INSULIN (HCC): Primary | ICD-10-CM

## 2018-11-20 LAB — HBA1C MFR BLD: 7.3 % (ref 4.3–5.7)

## 2018-11-20 PROCEDURE — G8427 DOCREV CUR MEDS BY ELIG CLIN: HCPCS | Performed by: INTERNAL MEDICINE

## 2018-11-20 PROCEDURE — 3045F PR MOST RECENT HEMOGLOBIN A1C LEVEL 7.0-9.0%: CPT | Performed by: INTERNAL MEDICINE

## 2018-11-20 PROCEDURE — G8417 CALC BMI ABV UP PARAM F/U: HCPCS | Performed by: INTERNAL MEDICINE

## 2018-11-20 PROCEDURE — 83036 HEMOGLOBIN GLYCOSYLATED A1C: CPT | Performed by: INTERNAL MEDICINE

## 2018-11-20 PROCEDURE — 2022F DILAT RTA XM EVC RTNOPTHY: CPT | Performed by: INTERNAL MEDICINE

## 2018-11-20 PROCEDURE — G8484 FLU IMMUNIZE NO ADMIN: HCPCS | Performed by: INTERNAL MEDICINE

## 2018-11-20 PROCEDURE — 1036F TOBACCO NON-USER: CPT | Performed by: INTERNAL MEDICINE

## 2018-11-20 PROCEDURE — 99213 OFFICE O/P EST LOW 20 MIN: CPT | Performed by: INTERNAL MEDICINE

## 2019-01-08 RX ORDER — LISINOPRIL 2.5 MG/1
TABLET ORAL
Qty: 90 TABLET | Refills: 3 | OUTPATIENT
Start: 2019-01-08 | End: 2020-01-03 | Stop reason: SDUPTHER

## 2019-01-24 ENCOUNTER — TELEPHONE (OUTPATIENT)
Dept: INTERNAL MEDICINE CLINIC | Age: 24
End: 2019-01-24

## 2019-01-29 RX ORDER — PEN NEEDLE, DIABETIC 32GX 5/32"
NEEDLE, DISPOSABLE MISCELLANEOUS
Qty: 150 EACH | Refills: 5 | Status: SHIPPED | OUTPATIENT
Start: 2019-01-29 | End: 2019-07-29 | Stop reason: SDUPTHER

## 2019-01-29 RX ORDER — INSULIN GLARGINE 100 [IU]/ML
INJECTION, SOLUTION SUBCUTANEOUS
Qty: 15 PEN | Refills: 3 | Status: SHIPPED | OUTPATIENT
Start: 2019-01-29 | End: 2020-06-02

## 2019-02-11 ENCOUNTER — TELEPHONE (OUTPATIENT)
Dept: INTERNAL MEDICINE CLINIC | Age: 24
End: 2019-02-11

## 2019-02-11 DIAGNOSIS — E10.8 DM (DIABETES MELLITUS), TYPE 1 WITH COMPLICATIONS (HCC): ICD-10-CM

## 2019-02-11 RX ORDER — LANCETS 33 GAUGE
EACH MISCELLANEOUS
Qty: 150 EACH | Refills: 5 | Status: SHIPPED | OUTPATIENT
Start: 2019-02-11 | End: 2019-04-15 | Stop reason: SDUPTHER

## 2019-02-19 ENCOUNTER — OFFICE VISIT (OUTPATIENT)
Dept: INTERNAL MEDICINE CLINIC | Age: 24
End: 2019-02-19
Payer: MEDICARE

## 2019-02-19 VITALS — WEIGHT: 158 LBS | BODY MASS INDEX: 24.75 KG/M2

## 2019-02-19 DIAGNOSIS — E10.8 TYPE 1 DIABETES MELLITUS WITH COMPLICATION, WITH LONG-TERM CURRENT USE OF INSULIN (HCC): Primary | ICD-10-CM

## 2019-02-19 LAB — HBA1C MFR BLD: 7.7 % (ref 4.3–5.7)

## 2019-02-19 PROCEDURE — G0108 DIAB MANAGE TRN  PER INDIV: HCPCS | Performed by: INTERNAL MEDICINE

## 2019-02-19 PROCEDURE — 83036 HEMOGLOBIN GLYCOSYLATED A1C: CPT | Performed by: INTERNAL MEDICINE

## 2019-04-15 ENCOUNTER — TELEPHONE (OUTPATIENT)
Dept: INTERNAL MEDICINE CLINIC | Age: 24
End: 2019-04-15

## 2019-04-15 DIAGNOSIS — E10.8 DM (DIABETES MELLITUS), TYPE 1 WITH COMPLICATIONS (HCC): ICD-10-CM

## 2019-04-15 RX ORDER — LANCETS 33 GAUGE
EACH MISCELLANEOUS
Qty: 200 EACH | Refills: 5 | Status: SHIPPED | OUTPATIENT
Start: 2019-04-15 | End: 2019-12-05 | Stop reason: SDUPTHER

## 2019-05-08 ENCOUNTER — OFFICE VISIT (OUTPATIENT)
Dept: INTERNAL MEDICINE CLINIC | Age: 24
End: 2019-05-08
Payer: MEDICARE

## 2019-05-08 DIAGNOSIS — E10.8 TYPE 1 DIABETES MELLITUS WITH COMPLICATION, WITH LONG-TERM CURRENT USE OF INSULIN (HCC): ICD-10-CM

## 2019-05-08 PROCEDURE — G0108 DIAB MANAGE TRN  PER INDIV: HCPCS | Performed by: INTERNAL MEDICINE

## 2019-05-08 NOTE — PATIENT INSTRUCTIONS
1.  For a low <70mg. 4-6oz juice and 6 crackers/cheese. 2oz juice may not be enough. 2.  Move your injections farther to the sides of abdomen. 3.  Try to exercise a minimum of 30mins per day to keep BS lower. 4.  On special olympic training nights. You need more in your snacks. Try an extra amount of fruit and possibly more protein like turkey pepperoni. 5.  Meeta's blood sugars do go up and down. Difficult to make adjustments in her insulins and more management for her. Calixto Ziegler would be an excellent option for her if covered by insurance. Discuss with Myla Mayer.   RN 3 months

## 2019-05-08 NOTE — PROGRESS NOTES
[facility]  [address] [facility] [address]  [phone] (phone)  [phone] (fax)    Patient ID: Rico Ly [id number]  Referring Provider: Dr. Oumar Abernathy     Patient's name and  were verified. Subjective:    She presents for Her follow-up diabetic visit. She has type 1 diabetes mellitus. Home regimen includes: insulin She is compliant most of the time. Assessment:     Lab Results   Component Value Date    LABA1C 7.7 2019    LABA1C 7.1 05/10/2018    BUN 13 05/10/2018    CREATININE 0.7 05/10/2018     There were no vitals filed for this visit. Wt Readings from Last 3 Encounters:   19 158 lb (71.7 kg)   18 161 lb (73 kg)   18 161 lb (73 kg)     Ht Readings from Last 3 Encounters:   18 5' 7\" (1.702 m)   18 5' 7\" (1.702 m)   18 5' 7\" (1.702 m)       Glucose at 2 hrs PPD today resulted at 117mg/dl  Current monitoring regimen: home blood tests - 4 times daily  Home blood sugar trends: turbulent. Any episodes of hypoglycemia? 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 Upcoming exam in may or 2019. Any history of foot problems? no  Last foot exam: checked at last visit. Immunizations up to date: na  Taking ASA:  No - If not why? na  Appropriate for use of MyChart Glucose Grid:  Yes    Focus:     Sean Campos is having turbulent BS control. After review of her logs, it appears that it is related to her activity. 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 Other days, no scheduled exercise unless she is prompted by her mom. Teaching done on 3 tools of BS control, looking at activity when calculating bolus as well as type of carbs she is eating.   Meeta and her mom struggle to finds foods that keep her BS more consistent. Yonathan López will not meter more than about 4x per day. Reviewed teaching rt pattern management, prevention/treatment of low BS, rotation of insulin injections. Future discussions may include using the freestyle yahaira if Meeta consents and a switch to tresiba for more consistent action and less risk of hypoglycemia. Yonathan López is is still a young type 2 and fairly lean, so her insulin sensitivity varies widely with activity. Has upcoming eye exam and A1c due. Agreed to plan. DSME PLAN:   Discussed general issues about diabetes pathophysiology and management. Counseling at today's visit: focused on the need for regular aerobic exercise, focused on the need to adhere to the prescribed ADA diet, reminded to check sugars regularly and to bring readings in at the time of the next visit and discussed management of hypoglycemic episodes. Meter download, medications, PMH and nursing assessment reviewed. Demetra Sebastian states She is willing to participate in this plan of care and verbalized understanding of all instructions provided. Teach back used to verify comprehension. Total time involved in direct patient education: 30 minutes. 1.  For a low <70mg. 4-6oz juice and 6 crackers/cheese. 2oz juice may not be enough. 2.  Move your injections farther to the sides of abdomen. 3.  Try to exercise a minimum of 30mins per day to keep BS lower. 4.  On special olympic training nights. You need more in your snacks. Try an extra amount of fruit and possibly more protein like turkey pepperoni. 5.  Meeta's blood sugars do go up and down. Difficult to make adjustments in her insulins and more management for her. Rubina Hatfield would be an excellent option for her if covered by insurance. Discuss with Bernardo Metcalf.   RN 3 months

## 2019-06-17 ENCOUNTER — TELEPHONE (OUTPATIENT)
Dept: INTERNAL MEDICINE CLINIC | Age: 24
End: 2019-06-17

## 2019-06-17 NOTE — TELEPHONE ENCOUNTER
Meeta's mother called and left a message and asked for a return call. She stated that Iliana Grey has a sore on her foot and she would like to discuss this with someone. Please return the call.

## 2019-06-19 NOTE — TELEPHONE ENCOUNTER
Call to Sandra Carranza. She reports the spots on her foot are getting better. No itching or drainage. There is nothing open. She is to call if any changes proplems.

## 2019-07-09 ENCOUNTER — TELEPHONE (OUTPATIENT)
Dept: INTERNAL MEDICINE CLINIC | Age: 24
End: 2019-07-09

## 2019-07-09 DIAGNOSIS — E10.8 TYPE 1 DIABETES MELLITUS WITH COMPLICATION, WITH LONG-TERM CURRENT USE OF INSULIN (HCC): Primary | ICD-10-CM

## 2019-07-09 NOTE — TELEPHONE ENCOUNTER
Patient has 8/20/2019 appt with you and Padma Mike (mother) is asking what labs do you want drawn for that appt? She will go to [facility].

## 2019-07-16 ENCOUNTER — PATIENT MESSAGE (OUTPATIENT)
Dept: OTHER | Age: 24
End: 2019-07-16

## 2019-07-16 NOTE — TELEPHONE ENCOUNTER
Meeta's mom's is asking about blood work that is due. Estrada Nichols is due for annual Lipid Panel, BMP and urine MA/Cr ratio                    And A1C  Is it ok to order these labs for Meeta to have done?

## 2019-07-27 ENCOUNTER — HOSPITAL ENCOUNTER (OUTPATIENT)
Age: 24
Discharge: HOME | End: 2019-07-27
Payer: MEDICARE

## 2019-07-27 DIAGNOSIS — E10.8 TYPE 1 DIABETES MELLITUS WITH COMPLICATION, WITH LONG-TERM CURRENT USE OF INSULIN (HCC): ICD-10-CM

## 2019-07-27 LAB
ANION GAP SERPL CALCULATED.3IONS-SCNC: 11 MEQ/L (ref 8–16)
AVERAGE GLUCOSE: 162 MG/DL (ref 70–126)
BUN BLDV-MCNC: 11 MG/DL (ref 7–22)
CALCIUM SERPL-MCNC: 9.1 MG/DL (ref 8.5–10.5)
CHLORIDE BLD-SCNC: 103 MEQ/L (ref 98–111)
CHOLESTEROL, TOTAL: 123 MG/DL (ref 100–199)
CO2: 24 MEQ/L (ref 23–33)
CREAT SERPL-MCNC: 0.6 MG/DL (ref 0.4–1.2)
GFR SERPL CREATININE-BSD FRML MDRD: > 90 ML/MIN/1.73M2
GLUCOSE BLD-MCNC: 222 MG/DL (ref 70–108)
HBA1C MFR BLD: 7.4 % (ref 4.4–6.4)
HDLC SERPL-MCNC: 60 MG/DL
LDL CHOLESTEROL CALCULATED: 57 MG/DL
POTASSIUM SERPL-SCNC: 4.4 MEQ/L (ref 3.5–5.2)
SODIUM BLD-SCNC: 138 MEQ/L (ref 135–145)
TRIGL SERPL-MCNC: 32 MG/DL (ref 0–199)

## 2019-07-27 PROCEDURE — 80061 LIPID PANEL: CPT

## 2019-07-27 PROCEDURE — 83036 HEMOGLOBIN GLYCOSYLATED A1C: CPT

## 2019-07-27 PROCEDURE — 36415 COLL VENOUS BLD VENIPUNCTURE: CPT

## 2019-07-27 PROCEDURE — 80048 BASIC METABOLIC PNL TOTAL CA: CPT

## 2019-08-20 ENCOUNTER — OFFICE VISIT (OUTPATIENT)
Dept: INTERNAL MEDICINE CLINIC | Age: 24
End: 2019-08-20
Payer: MEDICARE

## 2019-08-20 VITALS
HEIGHT: 67 IN | WEIGHT: 166.5 LBS | BODY MASS INDEX: 26.13 KG/M2 | SYSTOLIC BLOOD PRESSURE: 111 MMHG | DIASTOLIC BLOOD PRESSURE: 67 MMHG

## 2019-08-20 DIAGNOSIS — E10.8 TYPE 1 DIABETES MELLITUS WITH COMPLICATION, WITH LONG-TERM CURRENT USE OF INSULIN (HCC): ICD-10-CM

## 2019-08-20 DIAGNOSIS — E10.8 TYPE 1 DIABETES MELLITUS WITH COMPLICATION, WITH LONG-TERM CURRENT USE OF INSULIN (HCC): Primary | ICD-10-CM

## 2019-08-20 PROCEDURE — G0108 DIAB MANAGE TRN  PER INDIV: HCPCS | Performed by: INTERNAL MEDICINE

## 2019-10-16 NOTE — TELEPHONE ENCOUNTER
Request per Meeta and her mom for refill on Glucagon kit and One Touch Control Solution. I am not aware of how to order control solution. Script for Glucagon attached.

## 2019-11-19 ENCOUNTER — OFFICE VISIT (OUTPATIENT)
Dept: INTERNAL MEDICINE CLINIC | Age: 24
End: 2019-11-19
Payer: MEDICARE

## 2019-11-19 VITALS
HEART RATE: 87 BPM | BODY MASS INDEX: 25.74 KG/M2 | WEIGHT: 164 LBS | RESPIRATION RATE: 17 BRPM | DIASTOLIC BLOOD PRESSURE: 60 MMHG | HEIGHT: 67 IN | SYSTOLIC BLOOD PRESSURE: 131 MMHG

## 2019-11-19 DIAGNOSIS — E10.8 TYPE 1 DIABETES MELLITUS WITH COMPLICATION, WITH LONG-TERM CURRENT USE OF INSULIN (HCC): Primary | ICD-10-CM

## 2019-11-19 LAB — HBA1C MFR BLD: 7.2 % (ref 4.3–5.7)

## 2019-11-19 PROCEDURE — 2022F DILAT RTA XM EVC RTNOPTHY: CPT | Performed by: INTERNAL MEDICINE

## 2019-11-19 PROCEDURE — G8417 CALC BMI ABV UP PARAM F/U: HCPCS | Performed by: INTERNAL MEDICINE

## 2019-11-19 PROCEDURE — 3044F HG A1C LEVEL LT 7.0%: CPT | Performed by: INTERNAL MEDICINE

## 2019-11-19 PROCEDURE — G8484 FLU IMMUNIZE NO ADMIN: HCPCS | Performed by: INTERNAL MEDICINE

## 2019-11-19 PROCEDURE — 99214 OFFICE O/P EST MOD 30 MIN: CPT | Performed by: INTERNAL MEDICINE

## 2019-11-19 PROCEDURE — 83036 HEMOGLOBIN GLYCOSYLATED A1C: CPT | Performed by: INTERNAL MEDICINE

## 2019-11-19 PROCEDURE — G8427 DOCREV CUR MEDS BY ELIG CLIN: HCPCS | Performed by: INTERNAL MEDICINE

## 2019-11-19 PROCEDURE — 1036F TOBACCO NON-USER: CPT | Performed by: INTERNAL MEDICINE

## 2019-12-05 DIAGNOSIS — E10.8 DM (DIABETES MELLITUS), TYPE 1 WITH COMPLICATIONS (HCC): ICD-10-CM

## 2019-12-06 RX ORDER — LANCETS 33 GAUGE
EACH MISCELLANEOUS
Qty: 5 EACH | Refills: 5 | Status: SHIPPED | OUTPATIENT
Start: 2019-12-06 | End: 2020-03-18 | Stop reason: SDUPTHER

## 2019-12-31 ENCOUNTER — TELEPHONE (OUTPATIENT)
Dept: INTERNAL MEDICINE CLINIC | Age: 24
End: 2019-12-31

## 2020-01-02 ENCOUNTER — OFFICE VISIT (OUTPATIENT)
Dept: INTERNAL MEDICINE CLINIC | Age: 25
End: 2020-01-02
Payer: MEDICARE

## 2020-01-02 VITALS
HEART RATE: 72 BPM | WEIGHT: 162.3 LBS | HEIGHT: 67 IN | DIASTOLIC BLOOD PRESSURE: 64 MMHG | BODY MASS INDEX: 25.47 KG/M2 | SYSTOLIC BLOOD PRESSURE: 122 MMHG

## 2020-01-02 PROCEDURE — G0108 DIAB MANAGE TRN  PER INDIV: HCPCS | Performed by: INTERNAL MEDICINE

## 2020-01-02 PROCEDURE — G0008 ADMIN INFLUENZA VIRUS VAC: HCPCS | Performed by: INTERNAL MEDICINE

## 2020-01-02 PROCEDURE — 90686 IIV4 VACC NO PRSV 0.5 ML IM: CPT | Performed by: INTERNAL MEDICINE

## 2020-01-02 NOTE — PROGRESS NOTES
After obtaining consent, and per orders of James Lennon RN, injection of the Flu Shot given in Right deltoid by [facility] May. Patient instructed to remain in clinic for 20 minutes afterwards, and to report any adverse reaction to me immediately.

## 2020-01-02 NOTE — PROGRESS NOTES
[facility]  [address] [address] [facility], [facility]  [phone] (phone)  [phone] (fax)    Patient ID: Navjot Valdez 1995  Referring Provider: Dr. May Short     Patient's name and  were verified. Subjective:    She presents for Her follow-up diabetic visit. She has type 1 diabetes mellitus. Home regimen includes: insulin She is compliant most of the time. Assessment:     Lab Results   Component Value Date    LABA1C 7.2 2019    LABA1C 7.4 2019    BUN 11 2019    CREATININE 0.6 2019     Vitals:    20 1308   BP: 122/64   Site: Left Upper Arm   Position: Sitting   Pulse: 72   Weight: 162 lb 4.8 oz (73.6 kg)   Height: 5' 7\" (1.702 m)     Wt Readings from Last 3 Encounters:   20 162 lb 4.8 oz (73.6 kg)   19 164 lb (74.4 kg)   19 166 lb 8 oz (75.5 kg)     Ht Readings from Last 3 Encounters:   20 5' 7\" (1.702 m)   19 5' 7\" (1.702 m)   19 5' 7\" (1.702 m)       Current monitoring regimen: home blood tests - 4 times daily  Home blood sugar trends: FBS's . Noon . Dinner [phone]. BT [id number]. 1am   Any episodes of hypoglycemia? yes - less than 1-2 times per month; timing random  Previous visit with dietician: remote  Current diet: B- oatmeal/ meat                 L- pasta w/ meat                 D- meatloaf                     Or Chicken dawood/ garlic bread                 Snack ftnoon -Rx bar (protein)                     Evening- popcorn or yogurt  Current exercise: shopping 3-5 days per week. ADL's  Eye exam current (within one year): yes 2019 Dr. Carolyn Phillips  Any history of foot problems? no  Last foot exam: 2019  Immunizations up to date: yes - 2019  Taking ASA:  No - If not why? Not indicated  Appropriate for use of MyChart Glucose Grid:  Yes    Focus: Follow up visit. Meeta's blood sugars vary. Mom reports that activity is different from day to day-some days with minimal activity.  Hypertrophy noted in lower abdomen where Meeta prefers giving most of her injections. Discussed that this area needs to be avoided- insulin may not be absorbing as well. Also discussed reviewing noon meal coverage --after she establishes new insulin injection sites. Discussed Dexcom CGM-mom is not sure they are interested. Discussed basal insulin coverage as Josué Luna is no longer on insurance formulary. Will request Lantus. Much encouragement given. Follow up 3 months. DSME PLAN:   Discussed general issues about diabetes pathophysiology and management. Counseling at today's visit: BG goals; carbs; exercise; injection site rotation; Dexcom CGM. 1. You have to find different spots on your stomach to give your insulin shots                -do not give shots where the bulges are at the bottom of your belly  2. Watch the blood sugars at supper after you have changed your spots to give insulin                 IF your blood sugars are still over 140 5-6 times per week                                =may need to use 1 unit per 7 grams carb at lunch  3. Check with insurance about the cost of your insulin            Long acting insulin choices:                  Levemir                  Lantus                  Chaparrita Ayala  4. Stay active. Every day you want to have 15 minutes of activity every afternoon and evening    Meter download, medications, PMH and nursing assessment reviewed. Scottarnaldo Cruz states She is willing to participate in this plan of care and verbalized understanding of all instructions provided. Teach back used to verify comprehension. Total time involved in direct patient education: 60 minutes.

## 2020-01-07 RX ORDER — LISINOPRIL 2.5 MG/1
TABLET ORAL
Qty: 90 TABLET | Refills: 3 | OUTPATIENT
Start: 2020-01-07 | End: 2020-12-30

## 2020-01-07 RX ORDER — LISINOPRIL 2.5 MG/1
TABLET ORAL
Qty: 90 TABLET | Refills: 3 | Status: SHIPPED | OUTPATIENT
Start: 2020-01-07 | End: 2020-06-02

## 2020-02-05 NOTE — TELEPHONE ENCOUNTER
Last visit- 1/2/2020  Next visit- 3/2/2020    Requested Prescriptions     Pending Prescriptions Disp Refills    Insulin Pen Needle 31G X 6 MM MISC [Pharmacy Med Name: Kayley Watson [id number]] 150 each 0     Sig: USE AS DIRECTED WITH INSULIN 5 TIMES A DAY

## 2020-03-02 ENCOUNTER — OFFICE VISIT (OUTPATIENT)
Dept: INTERNAL MEDICINE CLINIC | Age: 25
End: 2020-03-02
Payer: MEDICARE

## 2020-03-02 VITALS
BODY MASS INDEX: 25.58 KG/M2 | DIASTOLIC BLOOD PRESSURE: 80 MMHG | SYSTOLIC BLOOD PRESSURE: 118 MMHG | HEIGHT: 67 IN | WEIGHT: 163 LBS | HEART RATE: 60 BPM

## 2020-03-02 LAB — HBA1C MFR BLD: 7.7 % (ref 4.3–5.7)

## 2020-03-02 PROCEDURE — 83036 HEMOGLOBIN GLYCOSYLATED A1C: CPT | Performed by: INTERNAL MEDICINE

## 2020-03-02 PROCEDURE — G0108 DIAB MANAGE TRN  PER INDIV: HCPCS | Performed by: INTERNAL MEDICINE

## 2020-03-02 NOTE — PATIENT INSTRUCTIONS
1. Bedtime. If your blood sugar is over 180--protein snack only or no snack                    Protein - 1/4 cup nuts, cheese stick, or boiled egg. Or cucumber or tomato  2. Afternoon snack --plan on skipping or have cucumber or tomato             Plan on 15-20 gram afternoon snack if more than usual activity  3. Morning exercise 9:30am --no matter what  4.  Good job using the outside of your belly to give insulin shots

## 2020-03-02 NOTE — PROGRESS NOTES
[facility]  [address] [address] [facility], [place], [facility], [address]  [phone] (phone)  [phone] (fax)    Patient ID: Virgen Cruz 1995  Referring Provider: Dr. Teo Combs     Patient's name and  were verified. Subjective:    She presents for Her follow-up diabetic visit. She has type 1 diabetes mellitus. Home regimen includes: insulin She is noncompliant some of the time. Assessment:     Lab Results   Component Value Date    LABA1C 7.2 2019    LABA1C 7.4 2019    BUN 11 2019    CREATININE 0.6 2019     There were no vitals filed for this visit. Wt Readings from Last 3 Encounters:   20 162 lb 4.8 oz (73.6 kg)   19 164 lb (74.4 kg)   19 166 lb 8 oz (75.5 kg)     Ht Readings from Last 3 Encounters:   20 5' 7\" (1.702 m)   19 5' 7\" (1.702 m)   19 5' 7\" (1.702 m)       Glucose at 1.5 hrs PPD today resulted at 231mg/dl  Current monitoring regimen: home blood tests - 4 times daily  Home blood sugar trends:   Any episodes of hypoglycemia? yes - 3 times per week  Previous visit with dietician: remote  Current diet: B- [place] Instant Bkfst               L- egg salad sandwich/                D- meat/ veg/ potatp               Afternoon and bedtime snack  Current exercise: ADL's-less activity with winter months. Walking at the store 1-2 days per week  Eye exam current (within one year): yes Dr. Francoise Torres 2019  Any history of foot problems? no  Last foot exam: 3/2/20 Pedal pulses:   peripheral pulses symmetrical   Results of monofilament test: 10/10              Skin noted to be pale, cold to touch and hair is absent. Denies N/T  Immunizations up to date: yes -   Taking ASA:  No - If not why? Not indicated  Appropriate for use of MyChart Glucose Grid:  Yes    Focus: Follow up visit. Glucose levels are up and down. A1C today 7.7%. Gilbert Sonya presents with mom who states she is not as active as during the spring/ summer.  Discussed need/ desire for snacks in afternoon and evening, which may be getting in the way of blood sugar goals. Kylah Dodd and her mom agree to the plan below. Follow up visit 3months. Encouragement given. DSME PLAN:   Discussed general issues about diabetes pathophysiology and management. Counseling at today's visit: BG goals; carbs; exercise; insulin action; treating low BS. 1. Bedtime. If your blood sugar is over 180--protein snack only or no snack                    Protein - 1/4 cup nuts, cheese stick, or boiled egg. Or cucumber or tomato  2. Afternoon snack --plan on skipping or have cucumber or tomato             Plan on 15-20 gram afternoon snack if more than usual activity  3. Morning exercise 9:30am --no matter what  4. Good job using the outside of your belly to give insulin shots  Meter download, medications, PMH and nursing assessment reviewed. Isaias Fernandez states She is willing to participate in this plan of care and verbalized understanding of all instructions provided. Teach back used to verify comprehension. Total time involved in direct patient education: 60 minutes.

## 2020-03-18 RX ORDER — LANCETS 33 GAUGE
EACH MISCELLANEOUS
Qty: 5 EACH | Refills: 5 | Status: SHIPPED | OUTPATIENT
Start: 2020-03-18 | End: 2021-11-16 | Stop reason: SDUPTHER

## 2020-06-02 ENCOUNTER — OFFICE VISIT (OUTPATIENT)
Dept: INTERNAL MEDICINE CLINIC | Age: 25
End: 2020-06-02
Payer: MEDICARE

## 2020-06-02 ENCOUNTER — TELEPHONE (OUTPATIENT)
Dept: INTERNAL MEDICINE CLINIC | Age: 25
End: 2020-06-02

## 2020-06-02 VITALS
BODY MASS INDEX: 24.8 KG/M2 | HEART RATE: 110 BPM | DIASTOLIC BLOOD PRESSURE: 81 MMHG | TEMPERATURE: 98.6 F | HEIGHT: 67 IN | WEIGHT: 158 LBS | SYSTOLIC BLOOD PRESSURE: 133 MMHG

## 2020-06-02 LAB — HBA1C MFR BLD: 7.3 % (ref 4.3–5.7)

## 2020-06-02 PROCEDURE — 83036 HEMOGLOBIN GLYCOSYLATED A1C: CPT | Performed by: INTERNAL MEDICINE

## 2020-06-02 PROCEDURE — G0108 DIAB MANAGE TRN  PER INDIV: HCPCS | Performed by: INTERNAL MEDICINE

## 2020-06-02 NOTE — PROGRESS NOTES
[facility]  [address] [address] [address] [facility], [address]  [phone] (phone)  [phone] (fax)    Patient ID: Sofya Kimbrough 1995  Referring Provider: Dr. Mercedes Lugo     Patient's name and  were verified. Subjective:    She presents for Her follow-up diabetic visit. She has type 1 diabetes mellitus. Home regimen includes: insulin She is noncompliant some of the time. Assessment:     Lab Results   Component Value Date    LABA1C 7.7 2020    LABA1C 7.4 2019    BUN 11 2019    CREATININE 0.6 2019     There were no vitals filed for this visit. Wt Readings from Last 3 Encounters:   20 163 lb (73.9 kg)   20 162 lb 4.8 oz (73.6 kg)   19 164 lb (74.4 kg)     Ht Readings from Last 3 Encounters:   20 5' 7\" (1.702 m)   20 5' 7\" (1.702 m)   19 5' 7\" (1.702 m)       Glucose at 1 hrs PPD today resulted at 197mg/dl  Current monitoring regimen: home blood tests - 5 times daily  Home blood sugar trends: FBS's . Noon . Dinner . BT . 12am   Any episodes of hypoglycemia? yes - 2-3 per week; highest risk lunch and dinner  Previous visit with dietician: remote  Current diet: 3 meals per day. Omitting snacks most days. Current exercise: stays active; makes effort to move 7-9am, 6-7pm and in afternoon--gardening/ dancing  Eye exam current (within one year): yes Dr. Sarahi Najera 2019  Any history of foot problems? no  Last foot exam: 3/2/20  Immunizations up to date: yes - 2019  Taking ASA:  No - If not why? Not indicated  Appropriate for use of MyChart Glucose Grid:  Yes    Focus:     Diabetes follow up visit. A1C today 7.3%. No food logs with Meeta today, but she is counting carbs and using carb ratio with mom's supervision. Weight is down 4# from last visit and overall activity has increased with the season change. Glucose levels are quite variable.  Novolog is being dosed after eating so she can see what she has eaten and calculates dose. She is making efforts to be active after meals 3 times per day. Pieter Vinson and her mom are frustrated about 12am blood sugars that are in reasonable control but go up by morning without a snack. Both Meeta and mom adamantly object to CGM --even diagnostic CGM. Mom has been checking 11p-12am Bg and agrees to test 1-2 readings at 2-3am to note the trend in readings. Will proceed with plan below and follow up as needed. Follow up visit 3 months. DSME PLAN:   Discussed general issues about diabetes pathophysiology and management. Counseling at today's visit: action of insulin-need to bolus before meals; carbs; exercise; leroy phenomena. 1. Take your Novolog before you eat all of your meals--especially before supper            -decide what you are going to eat. Take your Novolog and eat. Stop/ do not add more to your                         Plate  2. Pick a small choice and try it for 3-4 days--which food choice helps get your morning blood sugar                  The best??            IF your bedtime blood sugar is over 180- try skipping the bedtime snack  3. Stay active--moving helps keep your blood sugar down. 4. If able, try to check a blood sugar 2-3am maybe 1-2 times per week    Meter download, medications, PMH and nursing assessment reviewed. Ivan Gregorio states She is willing to participate in this plan of care and verbalized understanding of all instructions provided. Teach back used to verify comprehension. Total time involved in direct patient education: 60 minutes.

## 2020-06-03 RX ORDER — PEN NEEDLE, DIABETIC 32GX 5/32"
NEEDLE, DISPOSABLE MISCELLANEOUS
Qty: 400 EACH | Refills: 1 | Status: SHIPPED | OUTPATIENT
Start: 2020-06-03 | End: 2020-10-21

## 2020-08-03 RX ORDER — INSULIN GLARGINE 100 [IU]/ML
INJECTION, SOLUTION SUBCUTANEOUS
Qty: 15 ML | Refills: 0 | Status: SHIPPED | OUTPATIENT
Start: 2020-08-03 | End: 2020-10-08

## 2020-09-03 ENCOUNTER — TELEPHONE (OUTPATIENT)
Dept: INTERNAL MEDICINE CLINIC | Age: 25
End: 2020-09-03

## 2020-09-03 ENCOUNTER — OFFICE VISIT (OUTPATIENT)
Dept: INTERNAL MEDICINE CLINIC | Age: 25
End: 2020-09-03

## 2020-09-03 ENCOUNTER — HOSPITAL ENCOUNTER (OUTPATIENT)
Age: 25
Discharge: HOME | End: 2020-09-03
Payer: MEDICARE

## 2020-09-03 VITALS — TEMPERATURE: 97.7 F | WEIGHT: 158.4 LBS | BODY MASS INDEX: 24.86 KG/M2 | HEIGHT: 67 IN

## 2020-09-03 LAB
ALBUMIN SERPL-MCNC: 4.7 G/DL (ref 3.5–5.1)
ALP BLD-CCNC: 65 U/L (ref 38–126)
ALT SERPL-CCNC: 15 U/L (ref 11–66)
ANION GAP SERPL CALCULATED.3IONS-SCNC: 8 MEQ/L (ref 8–16)
AST SERPL-CCNC: 14 U/L (ref 5–40)
AVERAGE GLUCOSE: 162 MG/DL (ref 70–126)
BILIRUB SERPL-MCNC: 0.6 MG/DL (ref 0.3–1.2)
BUN BLDV-MCNC: 11 MG/DL (ref 7–22)
CALCIUM SERPL-MCNC: 9.6 MG/DL (ref 8.5–10.5)
CHLORIDE BLD-SCNC: 104 MEQ/L (ref 98–111)
CHOLESTEROL, TOTAL: 140 MG/DL (ref 100–199)
CO2: 26 MEQ/L (ref 23–33)
CREAT SERPL-MCNC: 0.7 MG/DL (ref 0.4–1.2)
GFR SERPL CREATININE-BSD FRML MDRD: > 90 ML/MIN/1.73M2
GLUCOSE BLD-MCNC: 202 MG/DL (ref 70–108)
HBA1C MFR BLD: 7.4 % (ref 4.4–6.4)
HDLC SERPL-MCNC: 64 MG/DL
LDL CHOLESTEROL CALCULATED: 68 MG/DL
POTASSIUM SERPL-SCNC: 4.5 MEQ/L (ref 3.5–5.2)
SODIUM BLD-SCNC: 138 MEQ/L (ref 135–145)
TOTAL PROTEIN: 7.5 G/DL (ref 6.1–8)
TRIGL SERPL-MCNC: 40 MG/DL (ref 0–199)

## 2020-09-03 PROCEDURE — G0108 DIAB MANAGE TRN  PER INDIV: HCPCS | Performed by: INTERNAL MEDICINE

## 2020-09-03 PROCEDURE — 36415 COLL VENOUS BLD VENIPUNCTURE: CPT

## 2020-09-03 PROCEDURE — 80061 LIPID PANEL: CPT

## 2020-09-03 PROCEDURE — 80053 COMPREHEN METABOLIC PANEL: CPT

## 2020-09-03 PROCEDURE — 83036 HEMOGLOBIN GLYCOSYLATED A1C: CPT

## 2020-09-03 NOTE — PROGRESS NOTES
[facility]  [address] [address] [address] [facility], [place], [facility], [address]  [phone] (phone)  [phone] (fax)    Patient ID: Emily Toro 1995  Referring Provider: Dr. Alice Dee     Patient's name and  were verified. Subjective:    She presents for Her follow-up diabetic visit. She has type 1 diabetes mellitus. Home regimen includes: insulin She is compliant most of the time. Assessment:     Lab Results   Component Value Date    LABA1C 7.3 2020    LABA1C 7.4 2019    BUN 11 2019    CREATININE 0.6 2019     There were no vitals filed for this visit. Wt Readings from Last 3 Encounters:   20 158 lb (71.7 kg)   20 163 lb (73.9 kg)   20 162 lb 4.8 oz (73.6 kg)     Ht Readings from Last 3 Encounters:   20 5' 7\" (1.702 m)   20 5' 7\" (1.702 m)   20 5' 7\" (1.702 m)         Current monitoring regimen: home blood tests - 5 times daily  Home blood sugar trends: FBS's 119,123, 237-334. Lunch 123, 174, 182-282. Dinner , 319. BT 46, 140-285. 12am 117-225  Any episodes of hypoglycemia? yes - 2-4 times per week. Highest risk AC dinner and bedtime snack  Previous visit with dietician: remote  Current diet: B CIB regular  35gm             L  mac n cheese 65gm     Or    Yogurt 19gm             D potato/ meatloaf/ tomatoe  Current exercise: ADL's -very active outdoors  Eye exam current (within one year): yes 2019 Dr. Jeovanny Cottrell  Any history of foot problems? no  Last foot exam: 9/3/2020 Pedal pulses:   peripheral pulses symmetrical   Results of monofilament test: 10/10              Skin noted to be warm, pale and hair is reduced. Immunizations up to date: yes - 2019  Taking ASA:  No - If not why? Not indicated  Appropriate for use of MyChart Glucose Grid:  Yes    Focus:     Diabetes education. A1C obtained today but not resulted yet. Fasting and noon glucose levels are elevated and glucose levels before dinner and some bedtime are low.  Delon Sheridan is more active this time

## 2020-09-03 NOTE — TELEPHONE ENCOUNTER
Diabetes education  FBS's [id number], [id number]. Lunch 123, 174, 182-282. Dinner , 319. BT 46, 140-285. 12am 117-225  She is high in the morning and having some lows before dinner. Also higher readings at noon. Cannot rule out nocturnal lows; she could benefit from professional CGM. She is refusing personal CGM. Meds:  Lantus 22 units in AM and 10 units bedtime               Novolog 1:6 gm bkfst and 1:8 gm lunch and dinner                            Plus scale    Dr. Marleen Wyatt,     Please authorize the [facility] at [address] [place] [address] to teach/ assist Meeta to change her Lantus dose to 20 units in AM and 12 units bedtime. And to increase Novolog dose at breakfast to 1:5 gm carb ratio  If you agree, please note and return. Thank you.

## 2020-09-03 NOTE — PATIENT INSTRUCTIONS
Change Lantus to 20 units in the morning and 12 units in the evening         --watch the morning blood sugars                 If you continue to get blood sugars over 180/200 after 5 days                        = consider 13 units in the evening                 After another 5-7 days, if blood sugars in the morning are over                                180/200 = consider 14 units in the evening  Change your breakfast Novolog to 1 unit for every 5 grams of carbohydrate                -to get your blood sugars better by lunch time  Stay active --great job!!  Make appointment to see the eye dooctor  IF we cannot get a handle on the nighttime blood sugars, we will want to        Try a Professional continuous glucose monitor to see what is going on  If you correct at midnight - use Novolog 200-300= 2 units, 300-400=3

## 2020-09-10 NOTE — TELEPHONE ENCOUNTER
Lulu Moyer and mom instructed on the insulin changes as directed. Lantus dose to 20 units in AM and 12 units bedtime. Increase Novolog dose at breakfast to 1:5 gm carb ratio  Meeta voiced understanding via teach back.

## 2020-10-08 RX ORDER — INSULIN GLARGINE 100 [IU]/ML
INJECTION, SOLUTION SUBCUTANEOUS
Qty: 15 ML | Refills: 5 | Status: SHIPPED | OUTPATIENT
Start: 2020-10-08 | End: 2021-07-09 | Stop reason: SDUPTHER

## 2020-10-21 RX ORDER — PEN NEEDLE, DIABETIC 32GX 5/32"
NEEDLE, DISPOSABLE MISCELLANEOUS
Qty: 400 EACH | Refills: 0 | Status: SHIPPED | OUTPATIENT
Start: 2020-10-21 | End: 2021-01-22

## 2020-10-30 RX ORDER — BLOOD SUGAR DIAGNOSTIC
STRIP MISCELLANEOUS
Qty: 200 EACH | Refills: 5 | Status: SHIPPED | OUTPATIENT
Start: 2020-10-30 | End: 2021-06-01 | Stop reason: SDUPTHER

## 2020-11-17 ENCOUNTER — OFFICE VISIT (OUTPATIENT)
Dept: INTERNAL MEDICINE CLINIC | Age: 25
End: 2020-11-17
Payer: MEDICARE

## 2020-11-17 VITALS
BODY MASS INDEX: 25.74 KG/M2 | WEIGHT: 164 LBS | HEIGHT: 67 IN | TEMPERATURE: 97.2 F | DIASTOLIC BLOOD PRESSURE: 80 MMHG | SYSTOLIC BLOOD PRESSURE: 120 MMHG | HEART RATE: 80 BPM

## 2020-11-17 PROCEDURE — G8427 DOCREV CUR MEDS BY ELIG CLIN: HCPCS | Performed by: INTERNAL MEDICINE

## 2020-11-17 PROCEDURE — G8484 FLU IMMUNIZE NO ADMIN: HCPCS | Performed by: INTERNAL MEDICINE

## 2020-11-17 PROCEDURE — G8417 CALC BMI ABV UP PARAM F/U: HCPCS | Performed by: INTERNAL MEDICINE

## 2020-11-17 PROCEDURE — 1036F TOBACCO NON-USER: CPT | Performed by: INTERNAL MEDICINE

## 2020-11-17 PROCEDURE — 3051F HG A1C>EQUAL 7.0%<8.0%: CPT | Performed by: INTERNAL MEDICINE

## 2020-11-17 PROCEDURE — 99213 OFFICE O/P EST LOW 20 MIN: CPT | Performed by: INTERNAL MEDICINE

## 2020-11-17 PROCEDURE — 2022F DILAT RTA XM EVC RTNOPTHY: CPT | Performed by: INTERNAL MEDICINE

## 2020-11-17 ASSESSMENT — PATIENT HEALTH QUESTIONNAIRE - PHQ9
SUM OF ALL RESPONSES TO PHQ QUESTIONS 1-9: 0
1. LITTLE INTEREST OR PLEASURE IN DOING THINGS: 0
SUM OF ALL RESPONSES TO PHQ QUESTIONS 1-9: 0
2. FEELING DOWN, DEPRESSED OR HOPELESS: 0
SUM OF ALL RESPONSES TO PHQ9 QUESTIONS 1 & 2: 0
SUM OF ALL RESPONSES TO PHQ QUESTIONS 1-9: 0

## 2020-11-17 NOTE — PROGRESS NOTES
No probs. Normal U/S.  Cervical length 3 cm.   [facility] PROFESSIONAL [facility]  PHYSICIANS [facility] [address] [place], [address]  Dept: [phone]  Dept Fax: [phone]      Chief Complaint   Patient presents with    Annual Exam    Diabetes     TYPE 1     Patient presents for evaluation of IDDM. I saw her 12 months ago . This patient is followed regularly by Dr. Natalee Chua. Patient is in the diabetic clinic and sees Sanford Heaton  She also used to see Dr. Baltazar Hamm and Lakeshia Logan. She's been diabetic since 2006 at which time insulin was started. She is on basal bolus insulin  Sugars generally run below 200  No eye or foot problems  Past Medical History:   Diagnosis Date    ADHD (attention deficit hyperactivity disorder)     Asperger's disorder     Type I (juvenile type) diabetes mellitus without mention of complication, uncontrolled 8/16/2012       Current Outpatient Medications   Medication Sig Dispense Refill    blood glucose test strips (ONETOUCH ULTRA) strip CHECK GLUCOSE 6 TIMES A  each 5    Insulin Pen Needle (RELION PEN NEEDLES) 31G X 6 MM MISC USE AS DIRECTED WITH INSULIN 5 TIMES DAILY 400 each 0    insulin glargine (LANTUS SOLOSTAR) 100 UNIT/ML injection pen INJECT 20 UNITS SUBCUTANEOUSLY IN THE MORNING AND 12 UNITS IN THE EVENING 15 mL 5    OneTouch Delica Lancets 83O MISC USE 1 LANCET TO CHECK GLUCOSE 6 TIMES DAILY 5 each 5    lisinopril (PRINIVIL;ZESTRIL) 2.5 MG tablet TAKE ONE TABLET BY MOUTH ONCE DAILY 90 tablet 3    blood glucose test strips (ONE TOUCH ULTRA TEST) strip USE 1 STRIP TO TEST BLOOD SUGAR 6 TIMES PER DAY. Dx:  E10.8 200 strip 5    oxybutynin (DITROPAN XL) 15 MG extended release tablet       Insulin Aspart (NOVOLOG FLEXPEN SC) Inject into the skin 3 times daily (before meals) Inject 1:6 with breakfast 1:8 with lunch and 1:8 with dinner and 1:11 at bedtime. (max 40 units per day). Scale 151-190=add 1 unit, 191-230=add 2, 231-270=add 3, 271-310=4, 311-391=5, 392-430= 6      glucose blood VI test strips (FREESTYLE TEST STRIPS) strip 1 each by In Vitro route 4 times daily As needed. 150 each 3    TRUEPLUS LANCETS 30G MISC Use to check blood glucose level 6 times per day. Diagnosis: E10.8 200 each 5    Blood Glucose Monitoring Suppl ([employer] BLOOD GLUCOSE METER) w/Device KIT 1 Device by Does not apply route daily Provide what is covered by pt ins DX E10.65 1 kit 0    methylphenidate (CONCERTA) 27 MG CR tablet Take 27 mg by mouth every morning.  glucagon (GLUCAGON EMERGENCY) 1 MG injection USE AS ADVISED FOR LOW BLOOD SUGAR 1 kit 1     No current facility-administered medications for this visit. Review of Systems - Nothing new  Blood pressure 120/80, pulse 80, temperature 97.2 °F (36.2 °C), height 5' 7\" (1.702 m), weight 164 lb (74.4 kg), not currently breastfeeding. Physical Examination: Not repeated     Diagnosis Orders   1. Type 1 diabetes mellitus with complication, with long-term current use of insulin (HCC)         No orders of the defined types were placed in this encounter. Extensive counseling was done regarding diabetes. The goals are to decrease or prevent the complications of diabetes and improve survival. The following goals were discussed  HgbA1c < 7 (providing no hypoglycemia)    BMI  18-25    BP < 130/80    STATIN(medium or high risk)    DIET <20% protein  < 30% fat, no trans fats, calorie restricted if BMI high    URINE MICROALBUMIN/CREATINIE  < 30     DILATED EYE EXAM EVERY 1-2 YEARS    MONITOR FEET FOR SORES, NEUROPATHY, ETC    REGULAR DENTAL CARE    Hemoglobin A1c was 7.4. In September  She denies any hypoglycemia  Once again, she is followed in the diabetic clinic.   I will see her yearly

## 2020-12-17 ENCOUNTER — OFFICE VISIT (OUTPATIENT)
Dept: INTERNAL MEDICINE CLINIC | Age: 25
End: 2020-12-17
Payer: MEDICARE

## 2020-12-17 VITALS
WEIGHT: 166.8 LBS | HEART RATE: 76 BPM | DIASTOLIC BLOOD PRESSURE: 64 MMHG | SYSTOLIC BLOOD PRESSURE: 138 MMHG | BODY MASS INDEX: 26.18 KG/M2 | HEIGHT: 67 IN | TEMPERATURE: 97.9 F

## 2020-12-17 LAB — HBA1C MFR BLD: 6.9 % (ref 4.3–5.7)

## 2020-12-17 PROCEDURE — 83036 HEMOGLOBIN GLYCOSYLATED A1C: CPT | Performed by: INTERNAL MEDICINE

## 2020-12-17 PROCEDURE — G0108 DIAB MANAGE TRN  PER INDIV: HCPCS | Performed by: INTERNAL MEDICINE

## 2020-12-17 RX ORDER — MIRABEGRON 25 MG/1
35 TABLET, FILM COATED, EXTENDED RELEASE ORAL DAILY
COMMUNITY
Start: 2020-11-06 | End: 2021-11-16

## 2020-12-17 RX ORDER — METHYLPHENIDATE HYDROCHLORIDE EXTENDED RELEASE 20 MG/1
20 TABLET ORAL DAILY
COMMUNITY
Start: 2020-10-29 | End: 2021-04-28 | Stop reason: SDUPTHER

## 2020-12-17 NOTE — PROGRESS NOTES
[facility]  [facility] [address] [address] [address] [facility], [address]  [phone] (phone)  [phone] (fax)    Patient ID: Cary Iyer 1995  Referring Provider: Dr. Juanis Lombardi     Patient's name and  were verified. Subjective:    She presents for Her follow-up diabetic visit. She has type 1 diabetes mellitus. Home regimen includes: insulin She is compliant most of the time. Assessment:     Lab Results   Component Value Date    LABA1C 6.9 2020    LABA1C 7.4 2020    BUN 11 2020    CREATININE 0.7 2020     Vitals:    20 1410   BP: 138/64   Site: Right Upper Arm   Position: Sitting   Pulse: 76   Temp: 97.9 °F (36.6 °C)   Weight: 166 lb 12.8 oz (75.7 kg)   Height: 5' 7\" (1.702 m)     Wt Readings from Last 3 Encounters:   20 166 lb 12.8 oz (75.7 kg)   20 164 lb (74.4 kg)   20 158 lb 6.4 oz (71.8 kg)     Ht Readings from Last 3 Encounters:   20 5' 7\" (1.702 m)   20 5' 7\" (1.702 m)   20 5' 7\" (1.702 m)       Glucose at 1.5 hrs PPD today resulted at 151mg/dl  Current monitoring regimen: home blood tests - 4 times daily  Home blood sugar trends: FBS's 120-295. Noon , 227, 371. Dinner 61, . BT . 11:30pm   Any episodes of hypoglycemia? yes - 3-5 times per week; high risk before lunch and supper  Previous visit with dietician: remote  Current diet: B oatmeal or CIB/ milk or cereal                       L soup/ muelix                       D meatloaf/ cookie/ carrots/ b potato                       Rare bedtime snack  Current exercise: exercise 9:30am 5-10 minutes  Eye exam current (within one year): no 2019 Dr. Yasmine Dickerson  Any history of foot problems? no  Last foot exam: 9/3/2020  Immunizations up to date: no; refuses  Taking ASA:  No - If not why?  Not indicated  Appropriate for use of MyChart Glucose Grid:  Yes    Focus: Before bedtime = the dinner Novolog is too strong  4. Keep weight in control--avoid further weight gain (limit sweets and high calorie                       Foods)   5. Limit carbs at a meal to less than 60-80 grams and              Have at least 30 grams. Meter download, medications, PMH and nursing assessment reviewed. Trung Greer states She is willing to participate in this plan of care and verbalized understanding of all instructions provided. Teach back used to verify comprehension. Total time involved in direct patient education: 30 minutes.

## 2020-12-17 NOTE — PATIENT INSTRUCTIONS
1. Stick with 10 minutes of exercise in the morning 9:30am                                 And again in the afternoon and after supper  2. Make sure you have protein with all of your meals- especially breakfast                     And lunch                IF you are still having low blood sugars before lunch over 3 times per                                 Week                     Then decrease carb ratio at breakfast to 1:7  3. 3-4 low blood sugars at the same time of day = a pattern                 If this is happening before lunch, the breakfast Novolog is too strong                                                    Before dinner = the lunch Novolog is too strong                                                  Before bedtime = the dinner Novolog is too strong  4.  Keep weight in control--avoid further weight gain (limit sweets and high calorie                       Foods)

## 2020-12-30 RX ORDER — LISINOPRIL 2.5 MG/1
TABLET ORAL
Qty: 90 TABLET | Refills: 0 | Status: SHIPPED | OUTPATIENT
Start: 2020-12-30 | End: 2021-07-15

## 2020-12-30 RX ORDER — LISINOPRIL 2.5 MG/1
TABLET ORAL
Qty: 90 TABLET | Refills: 0 | Status: SHIPPED | OUTPATIENT
Start: 2020-12-30 | End: 2021-06-03

## 2021-01-22 RX ORDER — PEN NEEDLE, DIABETIC 32GX 5/32"
NEEDLE, DISPOSABLE MISCELLANEOUS
Qty: 400 EACH | Refills: 0 | Status: SHIPPED | OUTPATIENT
Start: 2021-01-22 | End: 2021-04-01

## 2021-02-03 DIAGNOSIS — E10.8 TYPE 1 DIABETES MELLITUS WITH COMPLICATION, WITH LONG-TERM CURRENT USE OF INSULIN (HCC): Primary | ICD-10-CM

## 2021-02-04 RX ORDER — LANCETS 30 GAUGE
EACH MISCELLANEOUS
Qty: 200 EACH | Refills: 3 | Status: SHIPPED | OUTPATIENT
Start: 2021-02-04 | End: 2021-06-28

## 2021-03-31 DIAGNOSIS — E10.8 TYPE 1 DIABETES MELLITUS WITH COMPLICATION, WITH LONG-TERM CURRENT USE OF INSULIN (HCC): Primary | ICD-10-CM

## 2021-04-01 RX ORDER — PEN NEEDLE, DIABETIC 32GX 5/32"
NEEDLE, DISPOSABLE MISCELLANEOUS
Qty: 400 EACH | Refills: 0 | Status: SHIPPED | OUTPATIENT
Start: 2021-04-01 | End: 2021-06-23

## 2021-05-29 DIAGNOSIS — E10.8 TYPE 1 DIABETES MELLITUS WITH COMPLICATION, WITH LONG-TERM CURRENT USE OF INSULIN (HCC): ICD-10-CM

## 2021-06-01 DIAGNOSIS — E10.8 TYPE 1 DIABETES MELLITUS WITH COMPLICATION, WITH LONG-TERM CURRENT USE OF INSULIN (HCC): ICD-10-CM

## 2021-06-01 RX ORDER — BLOOD SUGAR DIAGNOSTIC
STRIP MISCELLANEOUS
Qty: 200 EACH | Refills: 5 | Status: SHIPPED | OUTPATIENT
Start: 2021-06-01 | End: 2021-06-03

## 2021-06-04 RX ORDER — BLOOD SUGAR DIAGNOSTIC
STRIP MISCELLANEOUS
Qty: 200 EACH | Refills: 5 | Status: SHIPPED | OUTPATIENT
Start: 2021-06-04 | End: 2022-03-07

## 2021-06-08 ENCOUNTER — OFFICE VISIT (OUTPATIENT)
Dept: INTERNAL MEDICINE CLINIC | Age: 26
End: 2021-06-08
Payer: MEDICARE

## 2021-06-08 DIAGNOSIS — E10.8 TYPE 1 DIABETES MELLITUS WITH COMPLICATION, WITH LONG-TERM CURRENT USE OF INSULIN (HCC): Primary | ICD-10-CM

## 2021-06-08 LAB — HBA1C MFR BLD: 7.6 % (ref 4.3–5.7)

## 2021-06-08 PROCEDURE — G0108 DIAB MANAGE TRN  PER INDIV: HCPCS | Performed by: INTERNAL MEDICINE

## 2021-06-08 PROCEDURE — 83036 HEMOGLOBIN GLYCOSYLATED A1C: CPT | Performed by: INTERNAL MEDICINE

## 2021-06-08 NOTE — PROGRESS NOTES
[facility]  [address] [address] Francisca [place], [address]  [phone] (phone)  [phone] (fax)    Patient ID: Rob Bryant [id number]  Referring Provider: Dr. Reid Byers     Patient's name and  were verified. Subjective:    She presents for Her follow-up diabetic visit. She has type 1 diabetes mellitus. Home regimen includes: Insulin She is noncompliant some of the time. Assessment:     Lab Results   Component Value Date    LABA1C 7.6 2021    LABA1C 7.4 2020    BUN 11 2020    CREATININE 0.7 2020     There were no vitals filed for this visit. Wt Readings from Last 3 Encounters:   20 166 lb 12.8 oz (75.7 kg)   20 164 lb (74.4 kg)   20 158 lb 6.4 oz (71.8 kg)     Ht Readings from Last 3 Encounters:   20 5' 7\" (1.702 m)   20 5' 7\" (1.702 m)   20 5' 7\" (1.702 m)       Glucose at 2 hrs PPD today resulted at 198mg/dl  Current monitoring regimen: home blood tests - 5-6 times daily  Home blood sugar trends: FBS's , 313. Noon . Dinner . BT . 12am 46,   Any episodes of hypoglycemia? yes - once daily; timing random; but most before bedtime  Previous visit with dietician: remote  Current diet: B CIB/ milk                       L rye bread and meat                       D vegetable/ meat/ starch  Current exercise: gardening-very active  Eye exam current (within one year): no 2019 Dr. Ingrid Crystal  Any history of foot problems? no  Last foot exam: 2021 Pedal pulses:   peripheral pulses symmetrical   Results of monofilament test: 10/10              Skin noted to be warm and hair is absent. Immunizations up to date: no  Taking ASA:  No   Appropriate for use of MyChart Glucose Grid:  Yes    Focus:     Diabetes education. A1C today 7.6%. Weight is stable. Activity is increased with summer and outdoor activities (watering gardens 2 times per day). Blood sugars are quite variable. Clarice Negron is not using consistent carb ratios for meals.

## 2021-06-08 NOTE — PATIENT INSTRUCTIONS
Try the One Touch Verio meter--if it is easier to handle these strips,             Let Oumou Weems know so that strips can be ordered for the Verio  Take Novolog 1 unit for every 6 grams at breakfast                           1 unit for every 7 grams at lunch                           1 units for every 8 grams at supper --make it 1 unit for 10                                          grams if you are doing a lot of gardening  Do extra research on the blood sugars from breakfast to lunch        Write down what you ate/ how much and how many carbs for breakfast        Write down what you do in the morning         Write down your lunch blood sugar  Move your spots around where  You give your insulin injections.  Move             Down the row of a  board--hit a new square each shot

## 2021-06-09 ENCOUNTER — TELEPHONE (OUTPATIENT)
Dept: INTERNAL MEDICINE CLINIC | Age: 26
End: 2021-06-09

## 2021-06-09 DIAGNOSIS — E10.8 TYPE 1 DIABETES MELLITUS WITH COMPLICATION, WITH LONG-TERM CURRENT USE OF INSULIN (HCC): Primary | ICD-10-CM

## 2021-06-09 NOTE — TELEPHONE ENCOUNTER
Patient receive a new One Touch Verio meter when she was at her appointment yesterday. Please sign the script to be able to get the strips for the new meter. Thank you.

## 2021-06-14 VITALS
HEART RATE: 91 BPM | WEIGHT: 164.4 LBS | TEMPERATURE: 97.4 F | HEIGHT: 67 IN | SYSTOLIC BLOOD PRESSURE: 142 MMHG | BODY MASS INDEX: 25.8 KG/M2 | DIASTOLIC BLOOD PRESSURE: 84 MMHG

## 2021-06-15 DIAGNOSIS — E10.8 TYPE 1 DIABETES MELLITUS WITH COMPLICATION, WITH LONG-TERM CURRENT USE OF INSULIN (HCC): Primary | ICD-10-CM

## 2021-06-15 NOTE — TELEPHONE ENCOUNTER
Patient is in need of her Glucagon to be refilled. Please sign the script for her to receive it.   Thank you

## 2021-06-21 ENCOUNTER — TELEPHONE (OUTPATIENT)
Dept: INTERNAL MEDICINE CLINIC | Age: 26
End: 2021-06-21

## 2021-06-21 NOTE — TELEPHONE ENCOUNTER
Pt's mother called stating pt's insurance will be sending paperwork to fill out for pt stating she checks blood sugars 6 times daily. RN to get paperwork done and fax back.

## 2021-06-22 DIAGNOSIS — E10.8 TYPE 1 DIABETES MELLITUS WITH COMPLICATION, WITH LONG-TERM CURRENT USE OF INSULIN (HCC): ICD-10-CM

## 2021-06-23 DIAGNOSIS — E10.8 TYPE 1 DIABETES MELLITUS WITH COMPLICATION, WITH LONG-TERM CURRENT USE OF INSULIN (HCC): Primary | ICD-10-CM

## 2021-06-23 RX ORDER — PEN NEEDLE, DIABETIC 32 GX 1/4"
NEEDLE, DISPOSABLE MISCELLANEOUS
Qty: 400 EACH | Refills: 0 | Status: SHIPPED | OUTPATIENT
Start: 2021-06-23 | End: 2021-08-30

## 2021-06-23 RX ORDER — PEN NEEDLE, DIABETIC 32GX 5/32"
NEEDLE, DISPOSABLE MISCELLANEOUS
Qty: 400 EACH | Refills: 0 | Status: SHIPPED | OUTPATIENT
Start: 2021-06-23 | End: 2021-11-16 | Stop reason: SDUPTHER

## 2021-06-23 NOTE — TELEPHONE ENCOUNTER
RN received a denial from [employer] for the one touch ultra test strips. RN called [facility] [address]THE[address] and spoke with an associate asking if they were billing the Medicare part B instead of Medicare part D as the denial letter states. Associate stated they have been billing under part D and it will not go through as of this year because of a change in coverage and they have not been able to get a single patient's test strips to go through if they test more than 3 times/day regardless of how many denials or chart notes that are sent. Pharmacy associate stated it could just be [facility] DR JERRY MATTHEW itself and maybe at a different pharmacy they may go through insurance without a problem. RN called pt's mother and explained situation to her-- mother understood. RN sent new script to [employer] in [place] to attempt approval of test strips.

## 2021-06-25 DIAGNOSIS — E10.8 TYPE 1 DIABETES MELLITUS WITH COMPLICATION, WITH LONG-TERM CURRENT USE OF INSULIN (HCC): ICD-10-CM

## 2021-06-28 DIAGNOSIS — E10.8 TYPE 1 DIABETES MELLITUS WITH COMPLICATION, WITH LONG-TERM CURRENT USE OF INSULIN (HCC): ICD-10-CM

## 2021-06-29 RX ORDER — LANCETS 30 GAUGE
EACH MISCELLANEOUS
Qty: 200 EACH | Refills: 3 | Status: SHIPPED | OUTPATIENT
Start: 2021-06-29 | End: 2021-09-10

## 2021-07-08 DIAGNOSIS — I10 HYPERTENSION, UNSPECIFIED TYPE: ICD-10-CM

## 2021-07-09 DIAGNOSIS — E10.8 TYPE 1 DIABETES MELLITUS WITH COMPLICATION, WITH LONG-TERM CURRENT USE OF INSULIN (HCC): ICD-10-CM

## 2021-07-09 RX ORDER — INSULIN GLARGINE 100 [IU]/ML
INJECTION, SOLUTION SUBCUTANEOUS
Qty: 15 ML | Refills: 5 | Status: SHIPPED | OUTPATIENT
Start: 2021-07-09 | End: 2022-04-20

## 2021-07-14 DIAGNOSIS — I10 HYPERTENSION, UNSPECIFIED TYPE: ICD-10-CM

## 2021-07-14 NOTE — TELEPHONE ENCOUNTER
Grace Dubon called requesting a refill on the following medications:  Requested Prescriptions     Pending Prescriptions Disp Refills    lisinopril (PRINIVIL;ZESTRIL) 2.5 MG tablet 90 tablet 0     Sig: Take 1 tablet by mouth once daily     Pharmacy verified: [facility] in [place]   .       Date of last visit: 6/08/2021  Date of next visit (if applicable): 8/02/1397

## 2021-07-15 RX ORDER — LISINOPRIL 2.5 MG/1
TABLET ORAL
Qty: 90 TABLET | Refills: 0 | Status: SHIPPED | OUTPATIENT
Start: 2021-07-15 | End: 2021-10-12

## 2021-07-19 RX ORDER — LISINOPRIL 2.5 MG/1
TABLET ORAL
Qty: 90 TABLET | Refills: 0 | Status: CANCELLED | OUTPATIENT
Start: 2021-07-19

## 2021-08-16 ENCOUNTER — TELEPHONE (OUTPATIENT)
Dept: INTERNAL MEDICINE CLINIC | Age: 26
End: 2021-08-16

## 2021-08-16 DIAGNOSIS — E10.8 TYPE 1 DIABETES MELLITUS WITH COMPLICATION, WITH LONG-TERM CURRENT USE OF INSULIN (HCC): Primary | ICD-10-CM

## 2021-08-16 NOTE — TELEPHONE ENCOUNTER
Patient's mother called in and stated that Dalton Vu is needing a script for her One Touch Verio strips. Please sign for the strips. Thank you.

## 2021-08-18 DIAGNOSIS — E10.8 TYPE 1 DIABETES MELLITUS WITH COMPLICATION, WITH LONG-TERM CURRENT USE OF INSULIN (HCC): ICD-10-CM

## 2021-08-18 NOTE — TELEPHONE ENCOUNTER
Patient's mother is calling back stating the pharmacy has not received this script. The script is showing it was sent and has a confirmed receipt on 08/17/2021. I contacted the pharmacy and they said that they had not received it but could take a verbal for the script or to have it resent. I spoke with Nikko Torre and she said she would contact the pharmacy.

## 2021-08-18 NOTE — TELEPHONE ENCOUNTER
Per Medicare Guidelines, it will only cover for checking 3 times a day. Per [facility] [place] request, need new electronic script for One Touch Verio Strips to check only 3 times a day. Please sign script.  Thank you

## 2021-08-19 NOTE — TELEPHONE ENCOUNTER
I called the pharmacy and they needed a new script for her One Touch Verio strips to say testing 3 times a day. I made a refill encounter and forwarded it to Dr. Patricia Garcia and he signed it.

## 2021-08-31 RX ORDER — PEN NEEDLE, DIABETIC 32 GX 1/4"
NEEDLE, DISPOSABLE MISCELLANEOUS
Qty: 400 EACH | Refills: 0 | Status: SHIPPED | OUTPATIENT
Start: 2021-08-31 | End: 2021-11-04

## 2021-09-20 ENCOUNTER — TELEPHONE (OUTPATIENT)
Dept: INTERNAL MEDICINE CLINIC | Age: 26
End: 2021-09-20

## 2021-09-20 ENCOUNTER — OFFICE VISIT (OUTPATIENT)
Dept: INTERNAL MEDICINE CLINIC | Age: 26
End: 2021-09-20
Payer: MEDICARE

## 2021-09-20 VITALS
DIASTOLIC BLOOD PRESSURE: 64 MMHG | TEMPERATURE: 98 F | HEART RATE: 110 BPM | WEIGHT: 162.6 LBS | HEIGHT: 67 IN | SYSTOLIC BLOOD PRESSURE: 134 MMHG | BODY MASS INDEX: 25.52 KG/M2

## 2021-09-20 DIAGNOSIS — E10.8 TYPE 1 DIABETES MELLITUS WITH COMPLICATION, WITH LONG-TERM CURRENT USE OF INSULIN (HCC): Primary | ICD-10-CM

## 2021-09-20 LAB — HBA1C MFR BLD: 7.2 % (ref 4.3–5.7)

## 2021-09-20 PROCEDURE — G0108 DIAB MANAGE TRN  PER INDIV: HCPCS | Performed by: INTERNAL MEDICINE

## 2021-09-20 PROCEDURE — 83036 HEMOGLOBIN GLYCOSYLATED A1C: CPT | Performed by: INTERNAL MEDICINE

## 2021-09-20 NOTE — TELEPHONE ENCOUNTER
Diabetes education. Poor meal clearance, elevated fasting readings and pattern of lows during the day 3-5hrpp. Dr. Jorge Austin,    Please authorize the Diabetes Clinic at [address] [place] [address] to teach/ assist Meeta to . Decrease Lantus from 20 units to 19 units in the morning and increase from 14 units to 13 units at bedtime     And to increase Novolog       From 1 for 7 grams to  1 for 6 grams carb at                                        breakfast           And 1 for 8 grams to 1 for 7 grams carb at lunch                  and dinner                                   If you agree, please note and return. Thank you. Also, mom is requesting annual lab orders to be done before her next visit with you. If you agree, please sign attached lab orders. Thank you.

## 2021-09-20 NOTE — PATIENT INSTRUCTIONS
Take Lantus 19 units in the morning and 13 units at bedtime  Use Novolog 1 for 6 grams carb at breakfast                          1 for 7 grams carb at lunch and dinner                                  (still use 1 for 8 grams for dinner when working outside                                               In the evening)  You have to have activity every day --gardening or outdoor activity                   When stuck inside the house--you have to have an exercise plan!!   Keep checking your blood sugars every day; before meals and bedtime              Goal os   Call the clinic if having more low blood sugars - 4-6 oz juice followed               By snack if not eating in the next hour

## 2021-09-20 NOTE — PROGRESS NOTES
[facility]  [address] [facility], [address]  [phone] (phone)  [phone] (fax)    Patient ID: Guerline Hilario 1995  Referring Provider: Dr. Yefri Lagunas     Patient's name and  were verified. Subjective:    She presents for Her follow-up diabetic visit. She has type 1 diabetes mellitus. Home regimen includes: Insulin She is noncompliant some of the time. Assessment:     Lab Results   Component Value Date    LABA1C 7.6 2021    LABA1C 7.4 2020    BUN 11 2020    CREATININE 0.7 2020     There were no vitals filed for this visit. Wt Readings from Last 3 Encounters:   21 164 lb 6.4 oz (74.6 kg)   20 166 lb 12.8 oz (75.7 kg)   20 164 lb (74.4 kg)     Ht Readings from Last 3 Encounters:   21 5' 7\" (1.702 m)   20 5' 7\" (1.702 m)   20 5' 7\" (1.702 m)       Glucose at 1 hrs PPD today resulted at 156mg/dl  Current monitoring regimen: home blood tests - 5 times daily  Home blood sugar trends: FBS's . Noon . Dinner . 2hrpp D 110-394. -176  Any episodes of hypoglycemia? yes - 2-4 times per week  Depression screening completed 2020  Previous visit with dietician: yes - 21  Current diet: B cheetos                       L sugar cream pie; mac and cheese; lunch meat                      D tator tot casserole/ hamburger or lasagna                      Snacks --salsa  Current exercise: ALD's- a lot of gardening  Eye exam current (within one year): no Dr. Daya Ivory appt 10/11/21 -last seen 2020  Any history of foot problems? no  Last foot exam: 2021  Immunizations up to date: yes -   Taking ASA:  No - If not why? Not indicated  Appropriate for use of MyChart Glucose Grid:  Yes    Focus:     Diabetes education. A1C today 7.2%. Glucose levels are quite variable. Weight is stable. Activity is maintained with gardening/ outdoor work. This will likely decrease with the winter months.  Meeta's meal plan has not

## 2021-09-22 NOTE — TELEPHONE ENCOUNTER
Esperanza Ventura and her mom instructed on these insulin changes as directed. Decrease Lantus from 20 units to 19 units in the morning and increase from 14 units to 13 units at bedtime     And increase Novolog       From 1 for 7 grams to  1 for 6 grams carb at                                        breakfast           And 1 for 8 grams to 1 for 7 grams carb at lunch                  and dinner  Dina Loera voice understanding via teach back.

## 2021-09-23 ENCOUNTER — TELEPHONE (OUTPATIENT)
Dept: INTERNAL MEDICINE CLINIC | Age: 26
End: 2021-09-23

## 2021-09-23 NOTE — TELEPHONE ENCOUNTER
----- Message from Mark Wright sent at 9/23/2021  9:47 AM EDT -----  Subject: Message to Provider    QUESTIONS  Information for Provider? Patient has prescription for 5 test strips a   day. Medicare will only approve three. Patient needs PCP to call the   medicare provider line to get more test trips approved. [phone]. Patient also need a log mailed to her home so she can keep track of her   testing so she can provide that to the pharmacy as well as medicare. Please call patient and advise. Thanks  ---------------------------------------------------------------------------  --------------  Willow ANGELES  What is the best way for the office to contact you? OK to leave message on   voicemail  Preferred Call Back Phone Number? [phone]  ---------------------------------------------------------------------------  --------------  SCRIPT ANSWERS  Relationship to Patient? Parent  Representative Name? Maria D Narayanan  Patient is under 25 and the Parent has custody? Yes  Additional information verified (besides Name and Date of Birth)?  Address

## 2021-09-24 ENCOUNTER — TELEPHONE (OUTPATIENT)
Dept: INTERNAL MEDICINE CLINIC | Age: 26
End: 2021-09-24

## 2021-10-12 DIAGNOSIS — I10 HYPERTENSION, UNSPECIFIED TYPE: ICD-10-CM

## 2021-10-12 RX ORDER — LISINOPRIL 2.5 MG/1
TABLET ORAL
Qty: 90 TABLET | Refills: 1 | Status: SHIPPED | OUTPATIENT
Start: 2021-10-12 | End: 2022-04-07 | Stop reason: SDUPTHER

## 2021-10-23 ENCOUNTER — HOSPITAL ENCOUNTER (OUTPATIENT)
Age: 26
Discharge: HOME | End: 2021-10-23
Payer: MEDICARE

## 2021-10-23 DIAGNOSIS — E10.8 TYPE 1 DIABETES MELLITUS WITH COMPLICATION, WITH LONG-TERM CURRENT USE OF INSULIN (HCC): ICD-10-CM

## 2021-10-23 LAB
ALBUMIN SERPL-MCNC: 4.6 G/DL (ref 3.5–5.1)
ALP BLD-CCNC: 66 U/L (ref 38–126)
ALT SERPL-CCNC: 24 U/L (ref 11–66)
ANION GAP SERPL CALCULATED.3IONS-SCNC: 8 MEQ/L (ref 8–16)
AST SERPL-CCNC: 16 U/L (ref 5–40)
BILIRUB SERPL-MCNC: 0.6 MG/DL (ref 0.3–1.2)
BUN BLDV-MCNC: 14 MG/DL (ref 7–22)
CALCIUM SERPL-MCNC: 9.4 MG/DL (ref 8.5–10.5)
CHLORIDE BLD-SCNC: 103 MEQ/L (ref 98–111)
CHOLESTEROL, FASTING: 131 MG/DL (ref 100–199)
CO2: 25 MEQ/L (ref 23–33)
CREAT SERPL-MCNC: 0.7 MG/DL (ref 0.4–1.2)
GFR SERPL CREATININE-BSD FRML MDRD: > 90 ML/MIN/1.73M2
GLUCOSE BLD-MCNC: 230 MG/DL (ref 70–108)
HDLC SERPL-MCNC: 67 MG/DL
LDL CHOLESTEROL CALCULATED: 55 MG/DL
POTASSIUM SERPL-SCNC: 4.5 MEQ/L (ref 3.5–5.2)
REASON FOR REJECTION: NORMAL
REJECTED TEST: NORMAL
SODIUM BLD-SCNC: 136 MEQ/L (ref 135–145)
T4 FREE: 1.16 NG/DL (ref 0.93–1.76)
TOTAL PROTEIN: 6.9 G/DL (ref 6.1–8)
TRIGLYCERIDE, FASTING: 44 MG/DL (ref 0–199)
TSH SERPL DL<=0.05 MIU/L-ACNC: 1.73 UIU/ML (ref 0.4–4.2)

## 2021-10-23 PROCEDURE — 84443 ASSAY THYROID STIM HORMONE: CPT

## 2021-10-23 PROCEDURE — 80061 LIPID PANEL: CPT

## 2021-10-23 PROCEDURE — 84439 ASSAY OF FREE THYROXINE: CPT

## 2021-10-23 PROCEDURE — 36415 COLL VENOUS BLD VENIPUNCTURE: CPT

## 2021-10-23 PROCEDURE — 80053 COMPREHEN METABOLIC PANEL: CPT

## 2021-11-16 ENCOUNTER — OFFICE VISIT (OUTPATIENT)
Dept: INTERNAL MEDICINE CLINIC | Age: 26
End: 2021-11-16
Payer: MEDICARE

## 2021-11-16 VITALS
WEIGHT: 167 LBS | TEMPERATURE: 97.6 F | DIASTOLIC BLOOD PRESSURE: 71 MMHG | SYSTOLIC BLOOD PRESSURE: 125 MMHG | BODY MASS INDEX: 26.21 KG/M2 | HEART RATE: 100 BPM | HEIGHT: 67 IN

## 2021-11-16 DIAGNOSIS — E10.9 TYPE 1 DIABETES MELLITUS NOT AT GOAL (HCC): Primary | ICD-10-CM

## 2021-11-16 DIAGNOSIS — I10 HYPERTENSION, UNSPECIFIED TYPE: ICD-10-CM

## 2021-11-16 PROCEDURE — 1036F TOBACCO NON-USER: CPT | Performed by: INTERNAL MEDICINE

## 2021-11-16 PROCEDURE — 2022F DILAT RTA XM EVC RTNOPTHY: CPT | Performed by: INTERNAL MEDICINE

## 2021-11-16 PROCEDURE — G8428 CUR MEDS NOT DOCUMENT: HCPCS | Performed by: INTERNAL MEDICINE

## 2021-11-16 PROCEDURE — 99214 OFFICE O/P EST MOD 30 MIN: CPT | Performed by: INTERNAL MEDICINE

## 2021-11-16 PROCEDURE — 3051F HG A1C>EQUAL 7.0%<8.0%: CPT | Performed by: INTERNAL MEDICINE

## 2021-11-16 PROCEDURE — G8484 FLU IMMUNIZE NO ADMIN: HCPCS | Performed by: INTERNAL MEDICINE

## 2021-11-16 PROCEDURE — G8417 CALC BMI ABV UP PARAM F/U: HCPCS | Performed by: INTERNAL MEDICINE

## 2021-11-16 RX ORDER — LANCETS 33 GAUGE
EACH MISCELLANEOUS
Qty: 5 EACH | Refills: 5 | Status: SHIPPED | OUTPATIENT
Start: 2021-11-16 | End: 2021-11-16 | Stop reason: CLARIF

## 2021-11-16 RX ORDER — BLOOD-GLUCOSE METER
EACH MISCELLANEOUS
Qty: 1 EACH | Refills: 0 | Status: SHIPPED | OUTPATIENT
Start: 2021-11-16 | End: 2022-03-07

## 2021-11-16 RX ORDER — BLOOD-GLUCOSE METER
EACH MISCELLANEOUS
Qty: 10 EACH | Refills: 2 | Status: SHIPPED | OUTPATIENT
Start: 2021-11-16 | End: 2022-03-07

## 2021-11-16 RX ORDER — PEN NEEDLE, DIABETIC 32GX 5/32"
NEEDLE, DISPOSABLE MISCELLANEOUS
Qty: 400 EACH | Refills: 0 | Status: SHIPPED | OUTPATIENT
Start: 2021-11-16 | End: 2022-03-07 | Stop reason: SDUPTHER

## 2021-11-16 RX ORDER — BLOOD-GLUCOSE METER
EACH MISCELLANEOUS
Qty: 10 EACH | Refills: 2 | Status: SHIPPED | OUTPATIENT
Start: 2021-11-16 | End: 2021-11-16

## 2021-11-21 NOTE — PROGRESS NOTES
[facility] [place] PROFESSIONAL SERVS  PHYSIC[facility]ANS [facility] [address] NELLA GALLEGOS AM OFFMIGNON ALVARADO.TAMMY, [address]  Dept: [phone]  Dept Fax: [phone]      Chief Complaint   Patient presents with    1 Year Follow Up    Diabetes     TYPE 1     Patient presents for evaluation of IDDM. I saw her 12 months ago . This patient is followed regularly by Dr. Maryse Bar. Patient is in the diabetic clinic and sees Camilla Diegot  She also used to see Dr. Maverick Adan and Iliana Chavez. She's been diabetic since 2006 at which time insulin was started. She is on basal bolus insulin  Sugars generally run below 200  No eye or foot problems  Past Medical History:   Diagnosis Date    ADHD (attention deficit hyperactivity disorder)     Asperger's disorder     Developmental delay     Overactive bladder     Type I (juvenile type) diabetes mellitus without mention of complication, uncontrolled 8/16/2012       Current Outpatient Medications   Medication Sig Dispense Refill    Insulin Pen Needle (RELION PEN NEEDLES) 31G X 6 MM MISC USE 5 TIMES DAILY 400 each 0    glucagon 1 MG injection USE AS ADVISED FOR LOW BLOOD SUGAR 1 kit 1    Blood Glucose Monitoring Suppl (POGO AUTOMATIC BLOOD GLUCOSE) GENNARO Use to check blood sugars 4 times a day 1 each 0    Glucose Blood Automatic (POGO AUTOMATIC TEST CARTRIDGES) TEST Use to check blood sugars 4 times a day 10 each 2    blood glucose test strips (ASCENSIA AUTODISC VI;ONE TOUCH ULTRA TEST VI) strip One Touch Verio Strips.  Patient is to check blood sugars 4 times a day 200 each 5    MYRBETRIQ 25 MG TB24 Take 1 tablet by mouth once daily 90 tablet 0    BD PEN NEEDLE MICRO U/F 32G X 6 MM MISC USE AS DIRECTED FIVE TIMES DAILY WITH  INSULIN 400 each 0    methylphenidate (METADATE ER) 20 MG extended release tablet take 1 tablet by mouth every morning 30 tablet 0    lisinopril (PRINIVIL;ZESTRIL) 2.5 MG tablet Take 1 tablet by mouth once daily 90 tablet 1    NOVOLOG FLEXPEN 100 UNIT/ML injection pen INJECT SUBCUTANEOUSLY AS DIRECTED FOR EVERY 6-8 CARBS. MAX OF 40 UNITS PER DAY 30 mL 3    Multiple Vitamin (MULTIVITAMIN ADULT PO) Take 1 tablet by mouth daily      Multiple Vitamins-Minerals (HAIR SKIN AND NAILS FORMULA) TABS Take 1 tablet by mouth daily      OneTouch Delica Lancets 97N MISC USE   TO CHECK GLUCOSE SIX TIMES DAILY 200 each 3    insulin glargine (LANTUS SOLOSTAR) 100 UNIT/ML injection pen INJECT 20 UNITS SUBCUTANEOUSLY IN THE MORNING AND 12 UNITS IN THE EVENING (Patient taking differently: INJECT 19 UNITS SUBCUTANEOUSLY IN THE MORNING AND 13 UNITS IN THE EVENING) 15 mL 5    blood glucose test strips (ONE TOUCH ULTRA TEST) strip USE 1 STRIP TO TEST BLOOD SUGAR 6 TIMES PER DAY. Dx:  E10.8 200 strip 5    blood glucose test strips (ASCENSIA AUTODISC VI;ONE TOUCH ULTRA TEST VI) strip Check Blood Sugars 6 times a day 200 each 3    blood glucose test strips (ONETOUCH ULTRA) strip USE  STRIP TO CHECK GLUCOSE SIX TIMES DAILY 200 each 5    Insulin Aspart (NOVOLOG FLEXPEN SC) Inject into the skin 3 times daily (before meals) Inject 1:6 with breakfast 1:7 with lunch and 1:7 with dinner and 1:11 at bedtime. (max 40 units per day). Scale 151-190=add 1 unit, 191-230=add 2, 231-270=add 3, 271-310=4, 311-391=5, 392-430= 6      TRUEPLUS LANCETS 30G MISC Use to check blood glucose level 6 times per day.  Diagnosis: E10.8 200 each 5    Blood Glucose Monitoring Suppl ([employer] BLOOD GLUCOSE METER) w/Device KIT 1 Device by Does not apply route daily Provide what is covered by pt ins DX E10.65 1 kit 0     No current facility-administered medications for this visit.        Review of Systems - General ROS: negative  Psychological ROS: negative  Hematological and Lymphatic ROS: negative  Respiratory ROS: no cough, shortness of breath, or wheezing  Cardiovascular ROS: no chest pain or dyspnea on exertion  Gastrointestinal ROS: no abdominal pain, change in bowel habits, or black or bloody stools  Genito-Urinary ROS: no dysuria, trouble voiding, or hematuria  Musculoskeletal ROS: negative  Neurological ROS: no TIA or stroke symptoms  Dermatological ROS: negative     Blood pressure 125/71, pulse 100, temperature 97.6 °F (36.4 °C), height 5' 7\" (1.702 m), weight 167 lb (75.8 kg), not currently breastfeeding.         Physical Examination: General appearance - alert, well appearing, and in no distress  Mental status - alert, oriented to person, place, and time  Neck - supple, no significant adenopathy  Chest - clear to auscultation, no wheezes, rales or rhonchi, symmetric air entry  Heart - normal rate, regular rhythm, normal S1, S2, no murmurs, rubs, clicks or gallops  Abdomen - soft, nontender, nondistended, no masses or organomegaly  Neurological - alert, oriented, normal speech, no focal findings or movement disorder noted  Musculoskeletal - no joint tenderness, deformity or swelling  Extremities - peripheral pulses normal, no pedal edema, no clubbing or cyanosis  Skin - normal coloration and turgor, no rashes, no suspicious skin lesions noted       Diagnosis Orders   1. Type 1 diabetes mellitus not at goal [facility])     2. Hypertension, unspecified type         No orders of the defined types were placed in this encounter. She is on basal and bolus insulin  Extensive counseling was done regarding diabetes. The goals are to decrease or prevent the complications of diabetes and improve survival. The following goals were discussed  HgbA1c < 7 (providing no hypoglycemia)    BMI  18-25    BP < 130/80    STATIN(medium or high risk)    DIET <20% protein  < 30% fat, no trans fats, calorie restricted if BMI high    URINE MICROALBUMIN/CREATINIE  < 30     DILATED EYE EXAM EVERY 1-2 YEARS    MONITOR FEET FOR SORES, NEUROPATHY, ETC    REGULAR DENTAL CARE    Hemoglobin A1c was 7.2. In September  She denies any hypoglycemia  Once again, she is followed in the diabetic clinic.   I will see her yearly

## 2022-01-18 ENCOUNTER — OFFICE VISIT (OUTPATIENT)
Dept: INTERNAL MEDICINE CLINIC | Age: 27
End: 2022-01-18
Payer: MEDICARE

## 2022-01-18 VITALS
TEMPERATURE: 97.9 F | HEART RATE: 64 BPM | SYSTOLIC BLOOD PRESSURE: 100 MMHG | WEIGHT: 162.8 LBS | DIASTOLIC BLOOD PRESSURE: 64 MMHG | BODY MASS INDEX: 25.55 KG/M2 | HEIGHT: 67 IN

## 2022-01-18 DIAGNOSIS — E10.8 TYPE 1 DIABETES MELLITUS WITH COMPLICATION, WITH LONG-TERM CURRENT USE OF INSULIN (HCC): Primary | ICD-10-CM

## 2022-01-18 LAB — HBA1C MFR BLD: 7.3 % (ref 4.3–5.7)

## 2022-01-18 PROCEDURE — G0108 DIAB MANAGE TRN  PER INDIV: HCPCS | Performed by: INTERNAL MEDICINE

## 2022-01-18 PROCEDURE — 83036 HEMOGLOBIN GLYCOSYLATED A1C: CPT | Performed by: INTERNAL MEDICINE

## 2022-01-18 NOTE — PROGRESS NOTES
[facility]  [facility] [address] [facility] [address]  [phone] (phone)  [phone] (fax)    Patient ID: Joselin Vasquez 1995  Referring Provider: Dr. Francoise Wheat     Patient's name and  were verified. Subjective:    She presents for Her follow-up diabetic visit. She has type 1 diabetes mellitus. Home regimen includes: Insulin She is noncompliant some of the time. Assessment:     Lab Results   Component Value Date    LABA1C 7.2 2021    LABA1C 7.4 2020    BUN 14 10/23/2021    CREATININE 0.7 10/23/2021     There were no vitals filed for this visit. Wt Readings from Last 3 Encounters:   21 167 lb (75.8 kg)   21 162 lb 9.6 oz (73.8 kg)   21 164 lb 6.4 oz (74.6 kg)     Ht Readings from Last 3 Encounters:   21 5' 7\" (1.702 m)   21 5' 7\" (1.702 m)   21 5' 7\" (1.702 m)       Glucose at 1.5 hrs PPD today resulted at 202mg/dl  Current monitoring regimen: home blood tests - 4 times daily  Home blood sugar trends: FBS's 101-321 Noon 117-266. Dinner 54, 74, 201-315. BT 68,   Any episodes of hypoglycemia? yes - 3-4 times per week; often assoc with activity  Depression screening completed 2022  Previous visit with dietician: 2021  Current diet: B CIB/ milk  30grams                       L soup 20 grams                       D handmade bread/ spam 60grams                       BT avoids  Current exercise: kicking exercising   Eye exam current (within one year): yes Dr. Jhoan Wyatt 10/22/21  Any history of foot problems? no  Last foot exam: 2022 Pedal pulses:   peripheral pulses symmetrical   Results of monofilament test: 10/10              Skin noted to be warm, well perfused and hair is present. Immunizations up to date: no; declines flu/ COVID  Taking ASA:  No - If not why? Not indicated  Appropriate for use of MyChart Glucose Grid:  Yes    Focus:     Diabetes education. A1C today 7.3%. Weight is stable.  Lizzie Locus is much less active during the winter months. She is making exercise efforts, but with much less intensity. Glucose levels appear to be higher as a result. She is struggling the most with fasting readings and after breakfast. Discussed basal impact on fasting readings and impact of exercise after breakfast to help clear this meal. Mom and Meeta are able to make adjustments in the the bedtime Lantus dose to address the seasonable higher fasting readings that will likely need to be reduced again in the spring. Mom has been trying to step away from Meeta's Diabetes management/ increase independence. But She needs to continue to plan an active role in her care to be sure Layman Afia is making good choices for herself. Follow up 3 months. DSME PLAN:   Discussed general issues about diabetes pathophysiology and management. Counseling at today's visit: BG goals; carbs; balance-insulin/carb/activity; treating low BS; exercise. 3 cups of popcorn = 15 grams carbohydration  Novolog Scale             150-200=add 2 unit,              201- 250=add 3 units,              251-300=add 4 units,              303-4 1-391=5 units,              392-430= 6 unit  Try Lantus 13 units at bedtime. IF the morning blood sugars after 5-7 days             Is still above 160--try 14 units Lantus at bedtime  Focus on getting your blood sugar down after eating--especially breakfast       --use your kick routine--get your heart rate up for at least 5 minutes  Make sure you take Glucose tablets with you in the car in case you have             A low blood sugar--you need these before your protein bar                  For any blood sugars less 65-70mg  Meter download, medications, PMH and nursing assessment reviewed. Sandra Yip states She is willing to participate in this plan of care and verbalized understanding of all instructions provided. Teach back used to verify comprehension. Total time involved in direct patient education: 60 minutes.

## 2022-01-27 ENCOUNTER — HOSPITAL ENCOUNTER (OUTPATIENT)
Age: 27
Setting detail: SPECIMEN
Discharge: HOME | End: 2022-01-27
Payer: MEDICARE

## 2022-01-27 LAB
CREATININE, URINE: 13.1 MG/DL
MICROALBUMIN UR-MCNC: < 1.2 MG/DL
MICROALBUMIN/CREAT UR-RTO: 92 MG/G (ref 0–30)

## 2022-01-27 PROCEDURE — 82043 UR ALBUMIN QUANTITATIVE: CPT

## 2022-03-07 DIAGNOSIS — E10.8 TYPE 1 DIABETES MELLITUS WITH COMPLICATION, WITH LONG-TERM CURRENT USE OF INSULIN (HCC): Primary | ICD-10-CM

## 2022-03-07 PROBLEM — N32.81 OVERACTIVE BLADDER: Status: ACTIVE | Noted: 2022-03-07

## 2022-03-07 PROBLEM — F84.5 ASPERGER'S SYNDROME: Status: ACTIVE | Noted: 2022-03-07

## 2022-03-07 PROBLEM — F90.2 ATTENTION DEFICIT HYPERACTIVITY DISORDER (ADHD), COMBINED TYPE: Status: ACTIVE | Noted: 2022-03-07

## 2022-03-07 RX ORDER — PEN NEEDLE, DIABETIC 32GX 5/32"
NEEDLE, DISPOSABLE MISCELLANEOUS
Qty: 400 EACH | Refills: 0 | Status: SHIPPED | OUTPATIENT
Start: 2022-03-07 | End: 2022-10-24 | Stop reason: SDUPTHER

## 2022-03-07 RX ORDER — PEN NEEDLE, DIABETIC 32GX 5/32"
NEEDLE, DISPOSABLE MISCELLANEOUS
Qty: 400 EACH | Refills: 0 | Status: SHIPPED | OUTPATIENT
Start: 2022-03-07 | End: 2022-06-27

## 2022-04-07 DIAGNOSIS — I10 HYPERTENSION, UNSPECIFIED TYPE: ICD-10-CM

## 2022-04-07 RX ORDER — LISINOPRIL 2.5 MG/1
TABLET ORAL
Qty: 90 TABLET | Refills: 0 | OUTPATIENT
Start: 2022-04-07

## 2022-04-07 RX ORDER — LISINOPRIL 2.5 MG/1
TABLET ORAL
Qty: 90 TABLET | Refills: 0 | Status: SHIPPED | OUTPATIENT
Start: 2022-04-07 | End: 2022-07-18

## 2022-04-19 ENCOUNTER — PHARMACY VISIT (OUTPATIENT)
Dept: INTERNAL MEDICINE CLINIC | Age: 27
End: 2022-04-19
Payer: MEDICARE

## 2022-04-19 DIAGNOSIS — E10.8 TYPE 1 DIABETES MELLITUS WITH COMPLICATION, WITH LONG-TERM CURRENT USE OF INSULIN (HCC): Primary | ICD-10-CM

## 2022-04-19 PROCEDURE — 83036 HEMOGLOBIN GLYCOSYLATED A1C: CPT | Performed by: INTERNAL MEDICINE

## 2022-04-19 PROCEDURE — G0108 DIAB MANAGE TRN  PER INDIV: HCPCS | Performed by: INTERNAL MEDICINE

## 2022-04-19 NOTE — PROGRESS NOTES
[facility]  [facility]. [address] [facility], [address]  [phone] (phone)  [phone] (fax)    Patient ID: Guerline Hilario 1995  Referring Provider: Dr. Best Galicia     Patient's name and  were verified. Subjective:    She presents for Her follow-up diabetic visit. She has type 1 diabetes mellitus. Home regimen includes: Insulin She is noncompliant some of the time. Assessment:     Lab Results   Component Value Date    LABA1C 7.5 2022    LABA1C 7.4 2020    BUN 14 10/23/2021    CREATININE 100.0 2022    CREATININE 0.7 10/23/2021     There were no vitals filed for this visit. Wt Readings from Last 3 Encounters:   22 165 lb (74.8 kg)   22 162 lb 12.8 oz (73.8 kg)   21 167 lb (75.8 kg)     Ht Readings from Last 3 Encounters:   22 5' 7\" (1.702 m)   22 5' 7\" (1.702 m)   21 5' 7\" (1.702 m)       Diabetes Pharmacotherapy:  Novolog per carb ratio 1:6 breakfast, 1:8 lunch, 1:8 dinner + gp 1 scale   Bedtime: > 200 : 3                   > 250: 4  Lantus 20 units in the AM and 13 units in the evening    Glucose Trends:   Current monitoring regimen: home blood tests - 4-5 times daily  Home blood sugar trends:    -Fasting AM: 191 ()   -Before lunch: 160 ()   -Before dinner: 223 (103-347)   -Bedtime: 184 ()  Any episodes of hypoglycemia? yes - 3x weekly    -Treats with granola bar    Lifestyle Factors:   Previous visit with dietician: yes - 2021  Current diet: B: 48g carbs croissant            L: varies                       D: potatoes, meat, veggies OR homemade [place] food - c.  Of fried; aims for 65g carbs  Current exercise: kicking exercise    Health Maintenance:  Eye exam current (within one year): yes - Dr. Melendrez Comp 10/22/2021  Any history of foot problems? no  Last foot exam: 22  Immunizations up to date: No - due for pneumonia vaccination  Last panel - LDL:   Lab Results   Component Value Date    LDLCALC 55 10/23/2021 Taking ASA:  No   Taking statin: No   Last microalbumin/creatinine ratio:   Microalbumin Creatinine Ratio   Date Value Ref Range Status   03/07/2022 <30  Final    Taking ACE/ARB: lisinopril     Focus:   Diabetes Education. A1C increased slightly to 7.5% (4/19/22). SMBG values are above goal on average (193), with highest readings at dinner and after dinner. Fasting AM glucose values are also not at goal, but these are more variable. [place] is checking glucose 4-5x daily; she would greatly benefit from a CGM. Discussed CGMs extensively. There is concern about CGM data being tracking/not being secure - recommended use of reader vs. phone for greater security. Offered Carla 2 sample to [place] Henderson, but she refuses at this time. Mother notes that Medicare is not covering test strips at current testing frequency, will reattempt appeal. Reviewed treatment of lows, including recheck of BG 15 minutes after treatment - Meeta expresses resistance to more frequent BG checks. DSME PLAN:   Discussed general issues about diabetes pathophysiology and management. Counseling at today's visit: reminded to check sugars regularly and to bring readings in at the time of the next visit and discussed management of hypoglycemic episodes. 1. Try to use a 1:8 carb ratio for snacks    2. Adjust your Novolog carb ratios to   1:6 breakfast, 1:7 lunch (stronger), 1:8 dinner   Novolog Scale             150-200=add 2 unit              201- 250=add 3 units             251-300=add 4 units             301-400=5 units              401-500= 6 units      Bedtime: > 200 : 3 units                  > 250: 4 units    Lantus 20 units in the morning and 13 units in the evening    3. Try to exercise for 20 minutes instead of 12 minutes after high carb meals    4.  Let us know if you want to try a [address] sample - [phone]    Treatment of hypoglycemia (low blood sugars):   -If sugar is below 80 mg/dL, treat with 15 grams of simple sugar/rapid acting carb (3-4 glucose tablets, 4 oz of regular juice, 1/2 can of pop, 5 sugar-containing candies, 1 tablespoon of honey, 13-15 sweet tarts). -Recheck in 15 minutes. If above 80 mg/dL, have small meal or snack. If not above 80 mg/dL, repeat the 15 grams of simple carbs until above 80 mg/dL. Meter download, medications, PMH and nursing assessment reviewed. Jai Bhardwaj states She is willing to participate in this plan of care and verbalized understanding of all instructions provided. Teach back used to verify comprehension. Follow-up: 3 month [facility] RN    Total time involved in direct patient education: 60 minutes. For Collin Rivas in place:   Yes   Recommendation Provided To: Patient/Caregiver: 3 via In person   Intervention Detail: Dose Adjustment: 1, reason: Therapy Optimization, New Rx: 1, reason: Patient Preference and Patient Access Assistance/Sample Provided   Gap Closed?: No    Intervention Accepted By: Patient/Caregiver: 2   Time Spent (min): [address], PharmD, [facility]  Internal Medicine Clinical Pharmacist  [phone]

## 2022-04-19 NOTE — PATIENT INSTRUCTIONS
1. Try to use a 1:8 carb ratio for snacks    2. Adjust your Novolog carb ratios to   1:6 breakfast, 1:7 lunch, 1:8 dinner   Novolog Scale             150-200=add 2 unit,              201- 250=add 3 units,              251-300=add 4 units,              301-400=5 units,              401-500= 6 units      Bedtime: > 200 : 3 units                  > 250: 4 units    Lantus 20 units in the morning and 13 units in the evening    3. Try to exercise for 20 minutes instead of 12 minutes after high carb meals    4. Let us know if you want to try a Carla sample - [phone]    Treatment of hypoglycemia (low blood sugars):   -If sugar is below 80 mg/dL, treat with 15 grams of simple sugar/rapid acting carb (3-4 glucose tablets, 4 oz of regular juice, 1/2 can of pop, 5 sugar-containing candies, 1 tablespoon of honey, 13-15 sweet tarts). -Recheck in 15 minutes. If above 80 mg/dL, have small meal or snack. If not above 80 mg/dL, repeat the 15 grams of simple carbs until above 80 mg/dL.

## 2022-04-20 DIAGNOSIS — E10.8 TYPE 1 DIABETES MELLITUS WITH COMPLICATION, WITH LONG-TERM CURRENT USE OF INSULIN (HCC): ICD-10-CM

## 2022-04-20 RX ORDER — INSULIN GLARGINE 100 [IU]/ML
INJECTION, SOLUTION SUBCUTANEOUS
Qty: 15 ML | Refills: 0 | Status: SHIPPED | OUTPATIENT
Start: 2022-04-20 | End: 2022-05-31

## 2022-05-31 DIAGNOSIS — E10.8 TYPE 1 DIABETES MELLITUS WITH COMPLICATION, WITH LONG-TERM CURRENT USE OF INSULIN (HCC): ICD-10-CM

## 2022-05-31 RX ORDER — LANCETS 30 GAUGE
EACH MISCELLANEOUS
Qty: 200 EACH | Refills: 0 | Status: SHIPPED | OUTPATIENT
Start: 2022-05-31 | End: 2022-06-01 | Stop reason: SDUPTHER

## 2022-05-31 RX ORDER — INSULIN GLARGINE 100 [IU]/ML
INJECTION, SOLUTION SUBCUTANEOUS
Qty: 15 ML | Refills: 0 | Status: SHIPPED | OUTPATIENT
Start: 2022-05-31 | End: 2022-07-25 | Stop reason: SDUPTHER

## 2022-06-01 DIAGNOSIS — E10.8 TYPE 1 DIABETES MELLITUS WITH COMPLICATION, WITH LONG-TERM CURRENT USE OF INSULIN (HCC): Primary | ICD-10-CM

## 2022-06-01 RX ORDER — LANCETS 30 GAUGE
EACH MISCELLANEOUS
Qty: 200 EACH | Refills: 0 | Status: SHIPPED | OUTPATIENT
Start: 2022-06-01

## 2022-06-02 RX ORDER — GLUCOSAMINE HCL/CHONDROITIN SU 500-400 MG
CAPSULE ORAL
Qty: 500 STRIP | Refills: 3 | Status: SHIPPED | OUTPATIENT
Start: 2022-06-02 | End: 2022-06-02 | Stop reason: SDUPTHER

## 2022-06-02 RX ORDER — GLUCOSAMINE HCL/CHONDROITIN SU 500-400 MG
CAPSULE ORAL
Qty: 500 STRIP | Refills: 3 | Status: SHIPPED | OUTPATIENT
Start: 2022-06-02

## 2022-06-02 RX ORDER — GLUCOSAMINE HCL/CHONDROITIN SU 500-400 MG
CAPSULE ORAL
Qty: 500 STRIP | Refills: 3 | Status: SHIPPED | OUTPATIENT
Start: 2022-06-02 | End: 2022-06-02

## 2022-06-09 NOTE — TELEPHONE ENCOUNTER
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 Also has B/L pitting edema of lower extremities, L > R, and petichiae over B/L upper extremities. Had shoulder surgery a few months ago. Was seen in PCP office (Dr. Vanessa Choudhary) who referred pt to ED for advanced imaging. Denies fever, chills, shortness of breath, chest pain, nausea, vomiting, diarrhea, or abdominal pain. Pt is on eliquis. 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 Also has B/L pitting edema of lower extremities, L > R, and petichiae over B/L upper extremities. Had shoulder surgery a few months ago. Was seen in PCP office (Dr. Vanessa Choudhary) who referred pt to ED for advanced imaging. Denies fever, chills, shortness of breath, chest pain, nausea, vomiting, diarrhea, or abdominal pain. Pt is on eliquis.

## 2022-06-27 ENCOUNTER — TELEPHONE (OUTPATIENT)
Dept: INTERNAL MEDICINE CLINIC | Age: 27
End: 2022-06-27

## 2022-06-27 DIAGNOSIS — E10.8 TYPE 1 DIABETES MELLITUS WITH COMPLICATION, WITH LONG-TERM CURRENT USE OF INSULIN (HCC): Primary | ICD-10-CM

## 2022-06-27 RX ORDER — PEN NEEDLE, DIABETIC 32GX 5/32"
NEEDLE, DISPOSABLE MISCELLANEOUS
Qty: 400 EACH | Refills: 0 | Status: SHIPPED | OUTPATIENT
Start: 2022-06-27 | End: 2022-10-17

## 2022-06-27 NOTE — TELEPHONE ENCOUNTER
Previously sent prescription with authorization to test 5x daily. Called pharmacy to confirm that this was authorized through insurance.

## 2022-07-18 DIAGNOSIS — I10 HYPERTENSION, UNSPECIFIED TYPE: ICD-10-CM

## 2022-07-18 RX ORDER — LISINOPRIL 2.5 MG/1
TABLET ORAL
Qty: 90 TABLET | Refills: 0 | Status: SHIPPED | OUTPATIENT
Start: 2022-07-18 | End: 2022-10-17 | Stop reason: SDUPTHER

## 2022-07-20 ENCOUNTER — OFFICE VISIT (OUTPATIENT)
Dept: INTERNAL MEDICINE CLINIC | Age: 27
End: 2022-07-20
Payer: MEDICARE

## 2022-07-20 VITALS
SYSTOLIC BLOOD PRESSURE: 132 MMHG | WEIGHT: 163.2 LBS | DIASTOLIC BLOOD PRESSURE: 78 MMHG | HEIGHT: 67 IN | BODY MASS INDEX: 25.62 KG/M2 | TEMPERATURE: 98.3 F | HEART RATE: 84 BPM

## 2022-07-20 DIAGNOSIS — E10.8 TYPE 1 DIABETES MELLITUS WITH COMPLICATION, WITH LONG-TERM CURRENT USE OF INSULIN (HCC): Primary | ICD-10-CM

## 2022-07-20 PROCEDURE — G0108 DIAB MANAGE TRN  PER INDIV: HCPCS | Performed by: INTERNAL MEDICINE

## 2022-07-20 PROCEDURE — 83036 HEMOGLOBIN GLYCOSYLATED A1C: CPT | Performed by: INTERNAL MEDICINE

## 2022-07-20 RX ORDER — BLOOD-GLUCOSE TRANSMITTER
EACH MISCELLANEOUS
Qty: 1 EACH | Refills: 3 | Status: SHIPPED | OUTPATIENT
Start: 2022-07-20

## 2022-07-20 RX ORDER — BLOOD-GLUCOSE SENSOR
EACH MISCELLANEOUS
Qty: 3 EACH | Refills: 11 | Status: SHIPPED | OUTPATIENT
Start: 2022-07-20

## 2022-07-20 RX ORDER — BLOOD-GLUCOSE,RECEIVER,CONT
EACH MISCELLANEOUS
Qty: 1 EACH | Refills: 0 | Status: SHIPPED | OUTPATIENT
Start: 2022-07-20

## 2022-07-20 NOTE — PROGRESS NOTES
[facility]  [facility] [address] [address] [address], [facility], [address]  [phone] (phone)  [phone] (fax)    Patient ID: Tano Ovalle 1995  Referring Provider: Dr. Pratima Huber     Patient's name and  were verified. Subjective:    She presents for Her follow-up diabetic visit. She has type 1 diabetes mellitus. Home regimen includes: Insulin She is noncompliant some of the time. Assessment:     Lab Results   Component Value Date/Time    LABA1C 7.5 2022 08:34 AM    LABA1C 7.4 2020 08:40 AM    BUN 14 10/23/2021 08:33 AM    CREATININE 100.0 2022 01:48 PM    CREATININE 0.7 10/23/2021 08:33 AM     Vitals:    22 1523   BP: 132/78   Site: Right Upper Arm   Position: Sitting   Pulse: 84   Temp: 98.3 °F (36.8 °C)   Weight: 163 lb 3.2 oz (74 kg)   Height: 5' 7\" (1.702 m)     Wt Readings from Last 3 Encounters:   22 163 lb 3.2 oz (74 kg)   22 165 lb (74.8 kg)   22 162 lb 12.8 oz (73.8 kg)     Ht Readings from Last 3 Encounters:   22 5' 7\" (1.702 m)   22 5' 7\" (1.702 m)   22 5' 7\" (1.702 m)       Diabetes Pharmacotherapy:  Lantus 20 units in AM and 12 units pm  Novolog 1:6 ICR bkfst.  1:7 ICR lunch and dinner            Plus group 1 scale            Meeta has been modifying meal dose r/t activity 1:8 ICR    Glucose Trends:   Glucose at 2 hrs PPD today resulted at 154mg/dl  Current monitoring regimen: Fingerstick blood tests - 4-5 times daily  Home blood sugar trends:    -Fasting AM: 104-234   -Before lunch: 58,    -Before dinner:    -Bedtime: , 328  Any episodes of hypoglycemia?  yes - 2-3 per week; timing random   -Treats with grape juice 4 oz or 1T.honey    Lifestyle Factors:   Previous visit with dietician: yes - 22  Current diet: B: bread/ candy/ turkey            L: whole grain Meuslix (hot cereal)                       D: vegetables/ beef [place]/ bread/ potato                       Snacks:only for low blood sugars Beverages: water; ice  Current exercise: gardening; walking. Especially when BS is high (thirsty)    Health Maintenance:  Eye exam current (within one year): yes Date: 10/22/21, Dr. Jocy Griffin  Any history of foot problems? no  Foot exam up to date: yes Date: [facility]22[facility]  Immunizations up to date:    Pneumonia: no   COVID-19: no  The ASCVD Risk score (Jose Hazel., et al., 2013) failed to calculate for the following reasons: The 2013 ASCVD risk score is only valid for ages 36 to 78   Last panel - LDL:   Lab Results   Component Value Date    LDLCALC 55 10/23/2021     Taking ASA:  No   Taking statin: No  Last microalbumin/creatinine ratio:   Microalbumin Creatinine Ratio   Date Value Ref Range Status   03/07/2022 <30  Final      Taking ACE/ARB: Yes- lisinopril  Depression screening completed. 1/6/2022    Focus:   Diabetes Education. Last A1C: 7.5% 7/20/20. Unable to obtain A1C today-technical failure. Meeta's blood sugars are quite varied and she has been making more adjustments in her Novolog dosing r/t activity. It is difficult to know how calculated her changes are. We discussed CGM again. Ye Skelton is reluctant to try this approach, but we discussed the benefits including catching BS's before they are too low and correcting elevated BS's from poorly covered meals. She states she will try it. She is taking lantus 12 units at bedtime vs 13 units ordered. She will increase to the 13 units as ordered. Reviewed carbs/ activity r/t Novolog dosing. Much encouragement given. Curriculum Area Focus:  Foot care, Physical activity goals, Hypoglycemia management, Pattern management, and Lifestyle & healthy coping  DSME PLAN:     Take your Lantus 20 units in the morning and 13 units at bedtime  Stay active --at least 15-20 minutes exercise daily  Continue with Novolog  We will request the Dexcom  Will obtain an A1C at your next visit     Goals Addressed                   This Visit's Progress     Exercise 5x per week (30 min per time)   Not on track     Reduce calorie intake to <1600 calories per day   On track           Meter download, medications, PMH and nursing assessment reviewed with Mitzi Cohen, Pharm D. Be Norris states She is willing to participate in this plan of care and verbalized understanding of all instructions provided. Teach back used to verify comprehension. Follow-up: 3 months    Total time involved in direct patient education: 60 minutes.

## 2022-07-25 ENCOUNTER — TELEPHONE (OUTPATIENT)
Dept: INTERNAL MEDICINE CLINIC | Age: 27
End: 2022-07-25

## 2022-07-25 DIAGNOSIS — E10.8 TYPE 1 DIABETES MELLITUS WITH COMPLICATION, WITH LONG-TERM CURRENT USE OF INSULIN (HCC): ICD-10-CM

## 2022-07-25 RX ORDER — INSULIN GLARGINE 100 [IU]/ML
INJECTION, SOLUTION SUBCUTANEOUS
Qty: 30 ML | Refills: 0 | Status: SHIPPED | OUTPATIENT
Start: 2022-07-25 | End: 2022-10-04

## 2022-07-25 NOTE — TELEPHONE ENCOUNTER
Mohinder Gallagher called and notified clinic that Leslye Zuñiga needs more Lantus insulin - old pens left out in heat (denatured). Rx sent to pharmacy.      Last seen by Dr. Rojas Plants 11/21/21    For Vanna Balsam in place:  Yes  Recommendation Provided To: Patient/Caregiver: 1 via Telephone  Intervention Detail: Refill(s) Provided  Gap Closed?: No   Intervention Accepted By: Patient/Caregiver: 1  Time Spent (min): [address] Rubalcava, PharmD, [facility]  Internal Medicine Clinical Pharmacist  [phone]

## 2022-08-09 ENCOUNTER — OFFICE VISIT (OUTPATIENT)
Dept: INTERNAL MEDICINE CLINIC | Age: 27
End: 2022-08-09
Payer: MEDICARE

## 2022-08-09 DIAGNOSIS — E10.8 TYPE 1 DIABETES MELLITUS WITH COMPLICATION, WITH LONG-TERM CURRENT USE OF INSULIN (HCC): Primary | ICD-10-CM

## 2022-08-09 PROCEDURE — 95249 CONT GLUC MNTR PT PROV EQP: CPT | Performed by: INTERNAL MEDICINE

## 2022-08-09 NOTE — PATIENT INSTRUCTIONS
You can calibrate your dexcom if you think it is not reading correctly  If you have a low--remember that the Dexcom is slower than your fingerstick meter.  You may have to check your blood sugar after treating a low if you  Don't think it is coming--check your blood sugar after 15-20 minutes after   treatment

## 2022-08-29 ENCOUNTER — PHARMACY VISIT (OUTPATIENT)
Dept: INTERNAL MEDICINE CLINIC | Age: 27
End: 2022-08-29
Payer: MEDICARE

## 2022-08-29 DIAGNOSIS — E10.8 TYPE 1 DIABETES MELLITUS WITH COMPLICATION, WITH LONG-TERM CURRENT USE OF INSULIN (HCC): Primary | ICD-10-CM

## 2022-08-29 PROCEDURE — G0108 DIAB MANAGE TRN  PER INDIV: HCPCS | Performed by: INTERNAL MEDICINE

## 2022-08-29 NOTE — PROGRESS NOTES
[facility]  [address] Francisca Lyles, [address]  [phone] (phone)  [phone] (fax)    Patient ID: Brianna Jason 1995  Referring Provider: Dr. Koffi Bernstein     Patient's name and  were verified. Subjective:    She presents for Her follow-up diabetic visit. She has type 1 diabetes mellitus. Home regimen includes: Insulin She is compliant most of the time. Assessment:     Lab Results   Component Value Date/Time    LABA1C 7.5 2022 08:34 AM    LABA1C 7.4 2020 08:40 AM    BUN 14 10/23/2021 08:33 AM    CREATININE 100.0 2022 01:48 PM    CREATININE 0.7 10/23/2021 08:33 AM     There were no vitals filed for this visit. Wt Readings from Last 3 Encounters:   22 163 lb 3.2 oz (74 kg)   22 165 lb (74.8 kg)   22 162 lb 12.8 oz (73.8 kg)     Ht Readings from Last 3 Encounters:   22 5' 7\" (1.702 m)   22 5' 7\" (1.702 m)   22 5' 7\" (1.702 m)       Diabetes Pharmacotherapy:  Novolog per carb ratio 1:6 breakfast, 1:8 lunch, 1:8 dinner + gp 1 scale              Bedtime: > 200 : 3                              > 250: 4  Lantus 20 units in the AM and 13 units in the evening    Glucose Trends:   Current monitoring regimen: Dexcom CGM - checks 4+ times daily  Home blood sugar trends:    -V. High: 18%, High: 30%, Target: 49%, Low: 2%, V. Low: <1%  Any episodes of hypoglycemia? yes - 2-3x weekly   -Treats with juice or crackers    Lifestyle Factors:   Previous visit with dietician: yes - 2021  Current diet: B: muselei + cream            L: variable or skips                       D: hot dog w/out bun, 2 ears corn  Current exercise:  working out in the garden, walking      Focus:   Diabetes Education. Last A1C: 7.5% (22). Ashley Oneal presents for a follow-up visit after starting Dexcom. Overall, Meeta likes the [place] and it is working well. We discussed some Dexcom adhesion issues and provided sample overpatches.  Her time in range is below goal at 49% and Ashley Oneal is having significant glycemic variability. Most meals are poorly cleared, especially breakfast; however, day to day clearance is dependent on activity level. Discussed some adjustments in carbohydrate coverage. I also provided Galendavid Gaspar with a correction scale to use before meals. Curriculum Area Focus: Carbohydrate counting/meal planning, Medication adherence, and Diabetes Technology  DSME PLAN:     Adjust your Novolog/Humalog dosing to   -Breakfast: 1:5 carb ratio + scale  -Lunch: 1:8 carb ratio + scale  -Dinner: 1:8 carb ratio + scale  -If you need to, you can give the Novolog correction scale 3 hours after a meal  -Consider increasing the Carb estimate by 20% (times 1.2) for high fat meals    STR Group 1 Correction Scale  Blood Sugar Dose fast acting insulin    None   140-199 1 unit   200-249 2 units   250-299 3 units   300-349 4 units   350-399 5 units   400 and above 6 units        Goals Addressed    None          Meter download, medications, PMH and nursing assessment reviewed. Yeison Rodriguez states She is willing to participate in this plan of care and verbalized understanding of all instructions provided. Teach back used to verify comprehension. Follow-up: 2 month PharmD    Total time involved in direct patient education: 30 minutes.      For [address] [facility] in place:  Yes  Recommendation Provided To: Patient/Caregiver: 1 via In person  Intervention Detail: Dose Adjustment: 1, reason: Therapy Optimization  Gap Closed?: No   Intervention Accepted By: Patient/Caregiver: 1  Time Spent (min): Mini Phan, [facility]  Internal Medicine Clinical Pharmacist  [phone]

## 2022-08-29 NOTE — PATIENT INSTRUCTIONS
Adjust your Novolog/Humalog dosing to   -Breakfast: 1:5 carb ratio + scale  -Lunch: 1:8 carb ratio + scale  -Dinner: 1:8 carb ratio + scale  -If you need to, you can give the Novolog correction scale 3 hours after a meal  -Consider increasing the Carb estimate by 20% (times 1.2) for high fat meals    STR Group 1 Correction Scale  Blood Sugar Dose fast acting insulin    None   140-199 1 unit   200-249 2 units   250-299 3 units   300-349 4 units   350-399 5 units   400 and above 6 units     -    Ph-aaliyah phone number: [phone]

## 2022-09-27 DIAGNOSIS — E10.8 TYPE 1 DIABETES MELLITUS WITH COMPLICATION, WITH LONG-TERM CURRENT USE OF INSULIN (HCC): ICD-10-CM

## 2022-09-29 RX ORDER — INSULIN GLARGINE-YFGN 100 [IU]/ML
INJECTION, SOLUTION SUBCUTANEOUS
Qty: 30 ML | Refills: 0 | OUTPATIENT
Start: 2022-09-29

## 2022-09-30 DIAGNOSIS — E10.8 TYPE 1 DIABETES MELLITUS WITH COMPLICATION, WITH LONG-TERM CURRENT USE OF INSULIN (HCC): ICD-10-CM

## 2022-10-04 RX ORDER — INSULIN GLARGINE-YFGN 100 [IU]/ML
INJECTION, SOLUTION SUBCUTANEOUS
Qty: 30 ML | Refills: 0 | Status: SHIPPED | OUTPATIENT
Start: 2022-10-04

## 2022-10-15 DIAGNOSIS — E10.8 TYPE 1 DIABETES MELLITUS WITH COMPLICATION, WITH LONG-TERM CURRENT USE OF INSULIN (HCC): ICD-10-CM

## 2022-10-17 DIAGNOSIS — E10.8 TYPE 1 DIABETES MELLITUS WITH COMPLICATION, WITH LONG-TERM CURRENT USE OF INSULIN (HCC): ICD-10-CM

## 2022-10-17 DIAGNOSIS — I10 HYPERTENSION, UNSPECIFIED TYPE: ICD-10-CM

## 2022-10-17 RX ORDER — PEN NEEDLE, DIABETIC 32GX 5/32"
NEEDLE, DISPOSABLE MISCELLANEOUS
Qty: 400 EACH | Refills: 0 | Status: SHIPPED | OUTPATIENT
Start: 2022-10-17 | End: 2022-10-24 | Stop reason: SDUPTHER

## 2022-10-17 RX ORDER — LISINOPRIL 2.5 MG/1
TABLET ORAL
Qty: 90 TABLET | Refills: 0 | Status: SHIPPED | OUTPATIENT
Start: 2022-10-17

## 2022-10-24 ENCOUNTER — PHARMACY VISIT (OUTPATIENT)
Dept: INTERNAL MEDICINE CLINIC | Age: 27
End: 2022-10-24
Payer: MEDICARE

## 2022-10-24 VITALS
SYSTOLIC BLOOD PRESSURE: 147 MMHG | TEMPERATURE: 98.4 F | WEIGHT: 165.2 LBS | HEART RATE: 91 BPM | DIASTOLIC BLOOD PRESSURE: 72 MMHG | BODY MASS INDEX: 25.87 KG/M2

## 2022-10-24 VITALS
WEIGHT: 169.4 LBS | SYSTOLIC BLOOD PRESSURE: 124 MMHG | BODY MASS INDEX: 26.53 KG/M2 | DIASTOLIC BLOOD PRESSURE: 89 MMHG | TEMPERATURE: 98.3 F | HEART RATE: 82 BPM

## 2022-10-24 DIAGNOSIS — E10.8 TYPE 1 DIABETES MELLITUS WITH COMPLICATION, WITH LONG-TERM CURRENT USE OF INSULIN (HCC): ICD-10-CM

## 2022-10-24 PROCEDURE — G0108 DIAB MANAGE TRN  PER INDIV: HCPCS | Performed by: INTERNAL MEDICINE

## 2022-10-24 PROCEDURE — 83036 HEMOGLOBIN GLYCOSYLATED A1C: CPT | Performed by: INTERNAL MEDICINE

## 2022-10-24 RX ORDER — PEN NEEDLE, DIABETIC 32GX 5/32"
NEEDLE, DISPOSABLE MISCELLANEOUS
Qty: 400 EACH | Refills: 2 | Status: SHIPPED | OUTPATIENT
Start: 2022-10-24

## 2022-10-24 NOTE — PATIENT INSTRUCTIONS
Consider the Skin  overpatches    2. Consider increasing the Carb estimate by 20% (times 1.2) for high fat meals   -Fast food, fried foods, pasta w/ dawood, pizza     3. Try to get 30 minutes of exercise 5 days a week   -OR 20 minutes of dance every day    4.         -Breakfast: 1:5 carb ratio + scale  -Lunch: 1:8 (if active)  or 1:7 carb ratio + scale  -Dinner: 1:8 (if active) or 1:7 carb ratio + scale  -If you need to, you can give the Novolog correction scale 3 hours after a meal  -Consider increasing the Carb estimate by 20% (times 1.2) for high fat meals     5. Time to schedule your diabetes eye exam    STR Group 1 Correction Scale  Blood Sugar Dose fast acting insulin    None   140-199 1 unit   200-249 2 units   250-299 3 units   300-349 4 units   350-399 5 units   400 and above 6 units      Treatment of hypoglycemia (low blood sugars):   -If sugar is below 80 mg/dL, treat with 15 grams of simple sugar/rapid acting carb (3-4 glucose tablets, 4 oz of regular juice, 1/2 can of pop, 5 sugar-containing candies, 1 tablespoon of honey, 13-15 sweet tarts). -Recheck in 15 minutes. If above 80 mg/dL, have small meal or snack. If not above 80 mg/dL, repeat the 15 grams of simple carbs until above 80 mg/dL.      Latest Reference Range & Units 4/19/22 08:34 10/24/22 11:14   Hemoglobin A1C 4.3 - 5.7 % 7.5 (H) 7.3 (H)

## 2022-10-24 NOTE — PROGRESS NOTES
[facility]  [facility] [address] [address] [address] [facility], [address]  [phone] (phone)  [phone] (fax)    Patient ID: Lula Mcclure 1995  Referring Provider: Dr. Shruti George     Patient's name and  were verified. Subjective:    She presents for Her diabetes follow-up visit with her mother Fern Gill. She has type 1 diabetes mellitus. Home regimen includes: insulin She is noncompliant some of the time. Assessment:     Lab Results   Component Value Date/Time    LABA1C 7.3 10/24/2022 11:14 AM    LABA1C 7.4 2020 08:40 AM    BUN 14 10/23/2021 08:33 AM    CREATININE 100.0 2022 01:48 PM    CREATININE 0.7 10/23/2021 08:33 AM     Vitals:    10/24/22 1504   BP: 124/89   Pulse: 82   Temp: 98.3 °F (36.8 °C)   Weight: 169 lb 6.4 oz (76.8 kg)     Wt Readings from Last 3 Encounters:   10/24/22 169 lb 6.4 oz (76.8 kg)   22 163 lb 3.2 oz (74 kg)   22 165 lb (74.8 kg)     Ht Readings from Last 3 Encounters:   22 5' 7\" (1.702 m)   22 5' 7\" (1.702 m)   22 5' 7\" (1.702 m)       Diabetes Pharmacotherapy:  Humalog per Carb Ratio: B 1:5, L 1:7 or 1:8 (depending on exercise), D 1:8   -Plus group 1 scale  Lantus 20 QAM and 12 QHS    Glucose Trends:   Glucose at 3 hrs PPD today resulted at 226 mg/dl  Current monitoring regimen: Dexcom CGM - checks 4+ times daily  Home blood sugar trends:    -V. High: 9%, High: 36%, Target: 54%, Low: <1%, V. Low: <1%  Any episodes of hypoglycemia?  yes - 2x per week   -Treats with honey or fruit juice    Lifestyle Factors:   Previous visit with dietician: yes - 2021  Current diet: B: muselei OR instant breakfast            L: BK burger + anabela                        D: variable; homemade meal                       Snacks: chocolates                       Beverages:  Current exercise:  gardening in the afternoon or evening    Health Maintenance:  Eye exam current (within one year): yes Date: 10/2021, Dr. Timmy Anderson  Any history of foot problems? no  Foot exam up to date: yes Date: 1/2022  Immunizations up to date:    Pneumonia: no   Influenza: no  The ASCVD Risk score (Juliana KEYES, et al., 2019) failed to calculate for the following reasons: The 2019 ASCVD risk score is only valid for ages 36 to 78   Last panel - LDL:   Lab Results   Component Value Date    LDLCALC 55 10/23/2021   Taking ASA:  No   Taking statin: No - not indicated  Last microalbumin/creatinine ratio:   Microalbumin Creatinine Ratio   Date Value Ref Range Status   03/07/2022 <30  Final    Taking ACE/ARB: Yes- lisinopril    Focus:   Diabetes Education. Last A1C: 7.3% today, improved from 7.5% previously. CGM time in range is improved at 54%; however, it remains below goal.  We discussed strengthening the carb ratio for lunch and dinner on less active days. We also discussed increasing meal coverage for high fat/calorie meals. Although Jace Charles is active now with gardening, encouraged winter weather activity plan - mall walking, exercise videos, dancing. Reviewed treatment of hypoglycemia, as some episodes are being overcorrected. Encouraged scheduling of diabetes eye exam.      Curriculum Area Focus: Carbohydrate counting/meal planning, Physical activity goals, Hypoglycemia management, and Pattern management  DSME PLAN:       Consider the Skin  overpatches to improve Dexcom adhesion    2. Consider increasing the Carb estimate by 20% (times 1.2) for high fat meals   -Fast food, fried foods, pasta w/ dawood, pizza     3. Try to get 30 minutes of exercise 5 days a week   -OR 20 minutes of dance every day    4.         -Breakfast: 1:5 carb ratio + scale  -Lunch: 1:8 (if active)  or 1:7 carb ratio + scale  -Dinner: 1:8 (if active) or 1:7 carb ratio + scale  -If you need to, you can give the Novolog correction scale 3 hours after a meal    5.  Time to schedule your diabetes eye exam     STR Group 1 Correction Scale  Blood Sugar Dose fast acting insulin    None   140-199 1 unit   200-249 2 units   250-299 3 units   300-349 4 units   350-399 5 units   400 and above 6 units      Treatment of hypoglycemia (low blood sugars):   -If sugar is below 80 mg/dL, treat with 15 grams of simple sugar/rapid acting carb (3-4 glucose tablets, 4 oz of regular juice, 1/2 can of pop, 5 sugar-containing candies, 1 tablespoon of honey, 13-15 sweet tarts). -Recheck in 15 minutes. If above 80 mg/dL, have small meal or snack. If not above 80 mg/dL, repeat the 15 grams of simple carbs until above 80 mg/dL. Goals Addressed                   This Visit's Progress     Blood Pressure < 130/80   124/89     Exercise 5x per week (30 min per time)   On track     HEMOGLOBIN A1C < 7.2   7.3     Reduce calorie intake to <1600 calories per day   On track             Meter download, medications, PMH and nursing assessment reviewed. Yeison Rodriguez states She is willing to participate in this plan of care and verbalized understanding of all instructions provided. Teach back used to verify comprehension. Follow-up: 3 month DM Clinic RN    Total time involved in direct patient education: 60 minutes.      For [address] [place] [address] in place:  Yes  Recommendation Provided To: Patient/Caregiver: 2 via In person  Intervention Detail: Dose Adjustment: 1, reason: Therapy Optimization and Refill(s) Provided  Gap Closed?: No   Intervention Accepted By: Patient/Caregiver: 2  Time Spent (min): [address], PharmD, [facility]  Internal Medicine Clinical Pharmacist  [phone]

## 2023-01-30 ENCOUNTER — OFFICE VISIT (OUTPATIENT)
Dept: INTERNAL MEDICINE CLINIC | Age: 28
End: 2023-01-30

## 2023-01-30 VITALS
SYSTOLIC BLOOD PRESSURE: 108 MMHG | WEIGHT: 174.8 LBS | RESPIRATION RATE: 18 BRPM | DIASTOLIC BLOOD PRESSURE: 58 MMHG | HEART RATE: 86 BPM | OXYGEN SATURATION: 98 % | TEMPERATURE: 98.7 F | BODY MASS INDEX: 27.38 KG/M2

## 2023-01-30 DIAGNOSIS — E10.8 TYPE 1 DIABETES MELLITUS WITH COMPLICATION, WITH LONG-TERM CURRENT USE OF INSULIN (HCC): Primary | ICD-10-CM

## 2023-01-30 LAB — HBA1C MFR BLD: 7.2 % (ref 4.3–5.7)

## 2023-01-30 RX ORDER — INSULIN GLARGINE-YFGN 100 [IU]/ML
INJECTION, SOLUTION SUBCUTANEOUS
Qty: 30 ML | Refills: 1 | Status: SHIPPED | OUTPATIENT
Start: 2023-01-30 | End: 2023-02-28

## 2023-01-30 SDOH — ECONOMIC STABILITY: FOOD INSECURITY: WITHIN THE PAST 12 MONTHS, YOU WORRIED THAT YOUR FOOD WOULD RUN OUT BEFORE YOU GOT MONEY TO BUY MORE.: NEVER TRUE

## 2023-01-30 SDOH — ECONOMIC STABILITY: FOOD INSECURITY: WITHIN THE PAST 12 MONTHS, THE FOOD YOU BOUGHT JUST DIDN'T LAST AND YOU DIDN'T HAVE MONEY TO GET MORE.: NEVER TRUE

## 2023-01-30 ASSESSMENT — SOCIAL DETERMINANTS OF HEALTH (SDOH): HOW HARD IS IT FOR YOU TO PAY FOR THE VERY BASICS LIKE FOOD, HOUSING, MEDICAL CARE, AND HEATING?: NOT HARD AT ALL

## 2023-01-30 ASSESSMENT — PATIENT HEALTH QUESTIONNAIRE - PHQ9
SUM OF ALL RESPONSES TO PHQ QUESTIONS 1-9: 0
1. LITTLE INTEREST OR PLEASURE IN DOING THINGS: 0
SUM OF ALL RESPONSES TO PHQ QUESTIONS 1-9: 0
SUM OF ALL RESPONSES TO PHQ9 QUESTIONS 1 & 2: 0
SUM OF ALL RESPONSES TO PHQ QUESTIONS 1-9: 0
2. FEELING DOWN, DEPRESSED OR HOPELESS: 0
SUM OF ALL RESPONSES TO PHQ QUESTIONS 1-9: 0

## 2023-02-01 ENCOUNTER — TELEPHONE (OUTPATIENT)
Dept: INTERNAL MEDICINE CLINIC | Age: 28
End: 2023-02-01

## 2023-02-01 NOTE — TELEPHONE ENCOUNTER
Received communication from Preeti Dunaway, glargine is not on formulary list; brand name lantus is. Can patient switch?     Date of last visit 1/30 Dr. Twin Lai. Suzette Severs

## 2023-02-02 RX ORDER — INSULIN GLARGINE 100 [IU]/ML
INJECTION, SOLUTION SUBCUTANEOUS
Qty: 30 ML | Refills: 1 | Status: SHIPPED | OUTPATIENT
Start: 2023-02-02

## 2023-02-08 ENCOUNTER — TELEPHONE (OUTPATIENT)
Dept: INTERNAL MEDICINE CLINIC | Age: 28
End: 2023-02-08

## 2023-02-08 NOTE — TELEPHONE ENCOUNTER
Chirag Stevenson called - Lantus not preferred by insurance. Semglee appears to also not be preferred, but it is unclear? Unclear what alternative is insurance preferred- too soon to run test claims until 2/23. Will follow-up 2/23.      For Pharmacy Admin Tracking Only    Program: Medical Group  Time Spent (min): 1 [facility] 270, PharmD, [facility], [facility]  Internal Medicine Clinical Pharmacist  [phone]

## 2023-02-24 RX ORDER — INSULIN GLARGINE 100 [IU]/ML
INJECTION, SOLUTION SUBCUTANEOUS
Qty: 30 ML | Refills: 1 | Status: SHIPPED | OUTPATIENT
Start: 2023-02-24

## 2023-02-28 ENCOUNTER — TELEPHONE (OUTPATIENT)
Dept: INTERNAL MEDICINE CLINIC | Age: 28
End: 2023-02-28

## 2023-02-28 RX ORDER — INSULIN GLARGINE 100 [IU]/ML
INJECTION, SOLUTION SUBCUTANEOUS
Qty: 30 ML | Refills: 1 | OUTPATIENT
Start: 2023-02-28

## 2023-02-28 NOTE — TELEPHONE ENCOUNTER
Verbal Rx provided for switch to [address] [place] [address] for insurance reasons.      For Pharmacy Admin Tracking Only    Program: Medical Group  CPA in place:  Yes  Recommendation Provided To: Patient/Caregiver: 1 via Telephone  Intervention Detail: New Rx: 1, reason: Cost/Formulary Change  Intervention Accepted By: Patient/Caregiver: 1  Gap Closed?: No   Time Spent (min): 1 [facility] 270, PharmD, BCPS, [facility]  Internal Medicine Clinical Pharmacist  [phone]

## 2023-03-01 NOTE — TELEPHONE ENCOUNTER
Received a phone call requesting a refill for Meeta's Novolog. Pended for your review.      For Pharmacy Admin Tracking Only    Program: Medical Group  CPA in place:  No  Recommendation Provided To: Provider: 1 via Note to Provider  Intervention Detail: Refill(s) Provided  Intervention Accepted By: Provider: 1  Gap Closed?: No   Time Spent (min): 1 [facility] 270, PharmD, BCPS, [facility]  Internal Medicine Clinical Pharmacist  [phone]

## 2023-03-03 RX ORDER — INSULIN ASPART 100 [IU]/ML
INJECTION, SOLUTION INTRAVENOUS; SUBCUTANEOUS
Qty: 10 ADJUSTABLE DOSE PRE-FILLED PEN SYRINGE | Refills: 3 | Status: SHIPPED | OUTPATIENT
Start: 2023-03-03

## 2023-03-03 NOTE — TELEPHONE ENCOUNTER
Meeta's mother called and she is almost out of insulin. Tasha Rod routed this to Dr. Lorna Morales but he still has not signed for it and I have no way of reaching him. Can you please sign for this?     Date of last visit 1/30 Dr. Radha Robertson. Silvestre Fournier

## 2023-03-09 ENCOUNTER — HOSPITAL ENCOUNTER (OUTPATIENT)
Age: 28
Discharge: HOME | End: 2023-03-09
Payer: MEDICARE

## 2023-03-09 DIAGNOSIS — E10.8 TYPE 1 DIABETES MELLITUS WITH COMPLICATION, WITH LONG-TERM CURRENT USE OF INSULIN (HCC): ICD-10-CM

## 2023-03-09 LAB
ANION GAP SERPL CALC-SCNC: 11 MEQ/L (ref 8–16)
BUN SERPL-MCNC: 10 MG/DL (ref 7–22)
CALCIUM SERPL-MCNC: 9.1 MG/DL (ref 8.5–10.5)
CHLORIDE SERPL-SCNC: 105 MEQ/L (ref 98–111)
CHOLEST SERPL-MCNC: 140 MG/DL (ref 100–199)
CO2 SERPL-SCNC: 24 MEQ/L (ref 23–33)
CREAT SERPL-MCNC: 0.7 MG/DL (ref 0.4–1.2)
GFR SERPL CREATININE-BSD FRML MDRD: > 60 ML/MIN/1.73M2
GLUCOSE SERPL-MCNC: 187 MG/DL (ref 70–108)
HDLC SERPL-MCNC: 64 MG/DL
LDLC SERPL CALC-MCNC: 69 MG/DL
POTASSIUM SERPL-SCNC: 4.6 MEQ/L (ref 3.5–5.2)
SODIUM SERPL-SCNC: 140 MEQ/L (ref 135–145)
TRIGL SERPL-MCNC: 33 MG/DL (ref 0–199)

## 2023-03-09 PROCEDURE — 36415 COLL VENOUS BLD VENIPUNCTURE: CPT

## 2023-03-09 PROCEDURE — 80048 BASIC METABOLIC PNL TOTAL CA: CPT

## 2023-03-09 PROCEDURE — 80061 LIPID PANEL: CPT

## 2023-03-27 ENCOUNTER — OFFICE VISIT (OUTPATIENT)
Dept: INTERNAL MEDICINE CLINIC | Age: 28
End: 2023-03-27
Payer: MEDICARE

## 2023-03-27 VITALS
HEART RATE: 85 BPM | TEMPERATURE: 98.1 F | BODY MASS INDEX: 27.72 KG/M2 | HEIGHT: 67 IN | WEIGHT: 176.6 LBS | DIASTOLIC BLOOD PRESSURE: 83 MMHG | SYSTOLIC BLOOD PRESSURE: 137 MMHG

## 2023-03-27 DIAGNOSIS — E10.8 TYPE 1 DIABETES MELLITUS WITH COMPLICATION, WITH LONG-TERM CURRENT USE OF INSULIN (HCC): Primary | ICD-10-CM

## 2023-03-27 PROCEDURE — G0108 DIAB MANAGE TRN  PER INDIV: HCPCS | Performed by: REGISTERED NURSE

## 2023-03-27 PROCEDURE — NBSRV NON-BILLABLE SERVICE: Performed by: REGISTERED NURSE

## 2023-07-17 ENCOUNTER — TELEPHONE (OUTPATIENT)
Dept: INTERNAL MEDICINE CLINIC | Age: 28
End: 2023-07-17

## 2023-07-17 NOTE — TELEPHONE ENCOUNTER
Romario Hughes is requesting the Dexcom G7 CGM in lieu of her G6 for ease of use. Please consider pended script for Dexcom G7 sensors and reader.   Thank you

## 2023-07-18 RX ORDER — BLOOD-GLUCOSE SENSOR
1 EACH MISCELLANEOUS
Qty: 3 EACH | Refills: 5 | Status: SHIPPED | OUTPATIENT
Start: 2023-07-18

## 2023-07-18 RX ORDER — BLOOD-GLUCOSE,RECEIVER,CONT
1 EACH MISCELLANEOUS ONCE
Qty: 1 EACH | Refills: 0 | Status: SHIPPED | OUTPATIENT
Start: 2023-07-18 | End: 2023-07-18

## 2023-08-02 ENCOUNTER — OFFICE VISIT (OUTPATIENT)
Dept: INTERNAL MEDICINE CLINIC | Age: 28
End: 2023-08-02

## 2023-08-02 ENCOUNTER — ENROLLMENT (OUTPATIENT)
Dept: INTERNAL MEDICINE CLINIC | Age: 28
End: 2023-08-02

## 2023-08-02 DIAGNOSIS — E10.8 TYPE 1 DIABETES MELLITUS WITH COMPLICATION, WITH LONG-TERM CURRENT USE OF INSULIN (HCC): Primary | ICD-10-CM

## 2023-08-02 LAB — HBA1C MFR BLD: 7 % (ref 4.3–5.7)

## 2023-08-02 NOTE — PROGRESS NOTES
[facility]  [address] [place] [address] [facility], [address] [place] [address]  [phone] (phone)  [phone] (fax)    Patient ID: Breonna Camacho 1995  Referring Provider: Anthony Gottron, CNP     Patient's name and  were verified. Subjective:    She presents for a follow-up diabetic visit. She has type 1 diabetes mellitus. Home regimen includes: Insulin She is compliant a majority of the time. Assessment:     Lab Results   Component Value Date/Time    LABA1C 7.2 2023 02:23 PM    LABA1C 7.4 2020 08:40 AM    BUN 10 2023 07:35 AM    CREATININE 0.7 2023 07:35 AM     There were no vitals filed for this visit. Wt Readings from Last 3 Encounters:   23 173 lb (78.5 kg)   23 176 lb 9.6 oz (80.1 kg)   23 174 lb 12.8 oz (79.3 kg)     Ht Readings from Last 3 Encounters:   23 5' 7\" (1.702 m)   23 5' 7\" (1.702 m)   22 5' 7\" (1.702 m)       Diabetes Pharmacotherapy:  Novolog   Carb Ratio:    -Breakfast 1:4    -Lunch/Supper: 1:8 mostly (1:7 if less active)   Group 2 correction scale  Basaglar 20 units BID    Glucose Trends:   Glucose at 3 hrs PPD today resulted at 278 mg/dl  Current monitoring regimen: Dexcom CGM - checks 4+ times daily  Home blood sugar trends:    -V. High: 4%, High: 32%, Target: 61%, Low: 1%, V. Low: <1%   -Average Glucose: 166mg/dL; GMI: 7.3%;  %time CGM active 93%              -Moderate mealtime clearance, poor clearance of several lunches  Any episodes of hypoglycemia?  yes - 5x weekly, mostly in the afternoon   -Treats with 4 oz juice + 1 cracker sandwich    Lifestyle Factors:   Previous visit with dietician: yes - 2021  Current diet: B: 30g of carbs, bread, yogurt            L: Pasta, carrots, cabbage, tomatoes,                        D: potatoes, meatloaf, [place], [place]                         Snacks:popcorn                        Beverages: water  Current exercise: gardening, house cleaning    Health Maintenance:  Diabetes Management   Topic Date

## 2023-08-02 NOTE — PATIENT INSTRUCTIONS
Great job with your A1c - it went down to 7.0%!!    2. Try to use your 1:7 carb ratio on less active Can Do days    3. Keep your snack for lows on hand when you exercise    4. Try to eat protein with breakfast    5.  Try to get scheduled for your diabetes eye exam

## 2023-08-03 VITALS
HEART RATE: 93 BPM | BODY MASS INDEX: 28.13 KG/M2 | WEIGHT: 179.6 LBS | TEMPERATURE: 98.2 F | DIASTOLIC BLOOD PRESSURE: 73 MMHG | SYSTOLIC BLOOD PRESSURE: 128 MMHG

## 2023-11-01 ENCOUNTER — TELEPHONE (OUTPATIENT)
Dept: INTERNAL MEDICINE CLINIC | Age: 28
End: 2023-11-01

## 2023-11-01 ENCOUNTER — OFFICE VISIT (OUTPATIENT)
Dept: INTERNAL MEDICINE CLINIC | Age: 28
End: 2023-11-01

## 2023-11-01 VITALS
HEIGHT: 67 IN | WEIGHT: 182.6 LBS | SYSTOLIC BLOOD PRESSURE: 134 MMHG | BODY MASS INDEX: 28.66 KG/M2 | DIASTOLIC BLOOD PRESSURE: 71 MMHG | HEART RATE: 94 BPM

## 2023-11-01 DIAGNOSIS — E10.8 TYPE 1 DIABETES MELLITUS WITH COMPLICATION, WITH LONG-TERM CURRENT USE OF INSULIN (HCC): Primary | ICD-10-CM

## 2023-11-01 LAB — HBA1C MFR BLD: 6.8 % (ref 4.3–5.7)

## 2023-11-01 PROCEDURE — NBSRV NON-BILLABLE SERVICE: Performed by: REGISTERED NURSE

## 2023-11-01 NOTE — PATIENT INSTRUCTIONS
Lantus 18 units in the morning and 20 units at bedtime  Novolog 1 units per 4 grams carb breakfast                1 units per 6 grams carb lunch and dinner                 1 units per 12 grams carb for bedtime snack  Jordy Muir 10 minute chair workout            Or a total of 20-30 minutes of exercise every day  Goal for blood sugars before a meal                               Wood after eating in 1hour ----under 180/200                                     2hours after eating --150/160  Give Novolog 10 minutes before eating if -150                         10-20 minutes before if BS over 150                          Right before eating if BS is under 100  Call the clinic with any questions or blood sugar issues

## 2023-11-01 NOTE — PROGRESS NOTES
[facility]  [address] [place] [address], [address] [place] [address]  [phone] (phone)  [phone] (fax)    Patient ID: Terrance Gonzales 1995  Referring Provider: Lucho Cm CNP    Patient's name and  were verified. Subjective:    She presents for a follow-up diabetic visit. She has type 1 diabetes mellitus. Home regimen includes: Insulin She is compliant some of the time. Assessment:     Lab Results   Component Value Date/Time    LABA1C 6.8 2023 12:49 PM    LABA1C 7.4 2020 08:40 AM    BUN 10 2023 07:35 AM    CREATININE 0.7 2023 07:35 AM     Vitals:    23 1555   BP: 134/71   Site: Right Upper Arm   Position: Sitting   Pulse: 94   Weight: 82.8 kg (182 lb 9.6 oz)   Height: 1.702 m (5' 7\")     Wt Readings from Last 3 Encounters:   23 82.8 kg (182 lb 9.6 oz)   23 81.5 kg (179 lb 9.6 oz)   23 78.5 kg (173 lb)     Ht Readings from Last 3 Encounters:   23 1.702 m (5' 7\")   23 1.702 m (5' 7\")   23 1.702 m (5' 7\")       Diabetes Pharmacotherapy:  Basaglar 20 units in the morning and 10 units at night  Novolog 1:4 carb ratio breakfast; 1:7 lunch, 1:7 supper            Plus group 2 scale               3units in the middle of the night for BS over 200    Glucose Trends:   Glucose at 10min PPD today resulted at 129mg/dl             *CGM sensor reading 46, 65, 71        *Meeta refuses to have fingerstick done to recheck BG               levels  Current monitoring regimen: Dexcom CGM - checks 4+ times daily  Home blood sugar trends:    -V. High: 5%, High: 30%, Target: 64%, Low: 1%, V. Low: 1%   -Average Glucose: 159mg/dL; GMI: 7.1%;  %time CGM active 93%              -blood sugars are quite variable; pattern of glucose spikes followed by drops in BS 3-5 hrp  Any episodes of hypoglycemia?  yes - 1-2 per week; higher risk in the middle of the night   -Treats with 4 oz juice/ pb crackers    Lifestyle Factors:   Previous visit with dietician: remote  Current diet:

## 2023-11-01 NOTE — TELEPHONE ENCOUNTER
Diabetes Pharmacotherapy:  Basaglar 20 units in the morning and 10 units at night  Novolog 1:4 carb ratio breakfast; 1:7 lunch, 1:7 supper            Plus group 2 scale               3units in the middle of the night for BS over 200    Glucose Trends:   Glucose at 10min PPD today resulted at 129mg/dl  Current monitoring regimen: Dexcom CGM - checks 4+ times daily  Home blood sugar trends:    -V. High: 5%, High: 30%, Target: 64%, Low: 1%, V. Low: 1%   -Average Glucose: 159mg/dL; GMI: 7.1%;  %time CGM active 93%              -blood sugars are quite variable; pattern of glucose spikes followed by drops in BS 3-5 hrp  Any episodes of hypoglycemia? yes - 1-2 per week; higher risk in the middle of the night    REINIER Diaz CNP,    Please authorize the [facility] Clinic at [address] [place] [address] to teach/ assist Baron Neal to make the following changes in her insulin doses:  Lantus 20 units in the morning  --> decrease 18 units              20 units at bedtime  Novolog 1 units per 4 grams carb breakfast               1 units per 7 grams carb lunch and dinner                       --> decrease 6 grams               1 units per 12 grams carb for bedtime snack. If you agree, please note and return. Thank you.

## 2023-11-02 NOTE — TELEPHONE ENCOUNTER
Chris Saldaña and her mom instructed to make the following insulin settings as discussed:     Lantus 20 units in the morning  --> decrease 18 units              10 units at bedtime  Novolog 1 units per 4 grams carb breakfast               1 units per 7 grams carb lunch and dinner                       --> decrease 6 grams               1 units per 12 grams carb for bedtime snack. Mom and Meeta voice understanding of these changes via teach back.

## 2023-11-02 NOTE — TELEPHONE ENCOUNTER
I authorize  Henry Puente at the [facility] Clinic at [address] [place] [address] to teach/ assist aZhraa Wilson to make the following changes in her insulin doses:  Lantus 20 units in the morning  --> decrease 18 units              20 units at bedtime  Novolog 1 units per 4 grams carb breakfast               1 units per 7 grams carb lunch and dinner                       --> decrease 6 grams               1 units per 12 grams carb for bedtime snack.

## 2024-01-15 ENCOUNTER — TELEPHONE (OUTPATIENT)
Dept: INTERNAL MEDICINE CLINIC | Age: 29
End: 2024-01-15

## 2024-01-15 NOTE — TELEPHONE ENCOUNTER
Per request of mother, Estela, sent information about Omnipod 5 to Meeta via [employer] message    For Pharmacy Admin Tracking Only    Program: Medical Group  Time Spent (min): 10        Shilohvania Augustin PharmD, BCPS, [facility]  Internal Medicine Clinical Pharmacist  [phone]

## 2024-01-24 ENCOUNTER — TELEPHONE (OUTPATIENT)
Dept: INTERNAL MEDICINE CLINIC | Age: 29
End: 2024-01-24

## 2024-01-24 NOTE — TELEPHONE ENCOUNTER
Estela called reporting issues with Dexcom G6 transmitter being processed.     Completed PA for Dexcom G6 transmitter in CMM - approved.     Called [facility] to request urgent processing.     For Pharmacy Admin Tracking Only    Program: Medical Group  CPA in place:  No  Recommendation Provided To: Patient/Caregiver: 1 via Telephone  Intervention Detail: Benefit Assistance  Intervention Accepted By: Patient/Caregiver: 1  Gap Closed?: No   Time Spent (min): 45        Shiloh Augustin PharmD, BCPS, [facility]  Internal Medicine Clinical Pharmacist  [phone]

## 2024-03-27 ENCOUNTER — OFFICE VISIT (OUTPATIENT)
Dept: INTERNAL MEDICINE CLINIC | Age: 29
End: 2024-03-27

## 2024-03-27 DIAGNOSIS — E10.8 TYPE 1 DIABETES MELLITUS WITH COMPLICATION, WITH LONG-TERM CURRENT USE OF INSULIN (HCC): Primary | ICD-10-CM

## 2024-03-27 LAB — HBA1C MFR BLD: 6.9 % (ref 4.3–5.7)

## 2024-03-27 PROCEDURE — NBSRV NON-BILLABLE SERVICE: Performed by: PHARMACIST

## 2024-04-22 ENCOUNTER — TELEPHONE (OUTPATIENT)
Dept: PHARMACY | Facility: CLINIC | Age: 29
End: 2024-04-22

## 2024-04-22 NOTE — TELEPHONE ENCOUNTER
[facility] CLINICAL PHARMACY: ADHERENCE REVIEW  Identified care gap per [place]: fills at [facility]: ACE/ARB adherence    Patient also appears to be prescribed: ACE/ARB    ASSESSMENT    ACE/ARB ADHERENCE    Insurance Records claims through 24 (Prior Year PDC = not reported; YTD PDC = FIRST FILL  LISINOPRIL 2.5 MG TABLET last filled on 24 for 90 day supply. Next refill due: 24    Prescribed si tablet/capsule daily    Per Reconcile Dispense History: last filled on 24 for 90 day supply.     Per [facility]:  0 refills remaining.  Pharmacy will send a refill request to provider     BP Readings from Last 3 Encounters:   24 124/74   24 114/70   23 134/71     CrCl cannot be calculated (Patient's most recent lab result is older than the maximum 180 days allowed.).  Lab Results   Component Value Date    CREATININE 0.7 2023     Lab Results   Component Value Date    K 4.6 2023       PLAN  The following are interventions that have been identified:   Patient OVERDUE refilling LISINOPRIL 2.5 MG TABLET and active on home medication list.     No patient outreach planned at this time.  I will f/u to see if new rx is sent to the pharmacy     Recent Visits  Date Type Provider Dept   24 Office Visit Melia Diaz APRN - CNP [facility]   24 Office Visit Melia Diaz APRN - CNP [facility]   10/19/23 Office Visit Melia Diaz APRN - CNP [facility]   23 Office Visit Melia Diaz APRN - CNP [facility]   23 Office Visit Melia Diaz APRN - CNP [facility]   23 Office Visit Melia Diaz APRN - CNP [facility]   22 Office Visit Melia Diaz APRN - CNP [facility]   Showing recent visits within past 540 days with a meds authorizing provider and meeting all other requirements  Future Appointments  No visits were found

## 2024-06-29 ENCOUNTER — HOSPITAL ENCOUNTER (OUTPATIENT)
Age: 29
Discharge: HOME | End: 2024-06-29
Payer: MEDICARE

## 2024-06-29 DIAGNOSIS — E10.8 TYPE I DIABETES MELLITUS WITH COMPLICATION (HCC): ICD-10-CM

## 2024-06-29 DIAGNOSIS — Z13.220 LIPID SCREENING: ICD-10-CM

## 2024-06-29 DIAGNOSIS — Z13.31 DEPRESSION SCREEN: ICD-10-CM

## 2024-06-29 DIAGNOSIS — I10 HYPERTENSION, UNSPECIFIED TYPE: ICD-10-CM

## 2024-06-29 LAB
ALBUMIN SERPL BCG-MCNC: 4.4 G/DL (ref 3.5–5.1)
ALP SERPL-CCNC: 71 U/L (ref 38–126)
ALT SERPL W/O P-5'-P-CCNC: 11 U/L (ref 11–66)
ANION GAP SERPL CALC-SCNC: 9 MEQ/L (ref 8–16)
AST SERPL-CCNC: 14 U/L (ref 5–40)
BASOPHILS ABSOLUTE: 0 THOU/MM3 (ref 0–0.1)
BASOPHILS NFR BLD AUTO: 0.2 %
BILIRUB SERPL-MCNC: 0.5 MG/DL (ref 0.3–1.2)
BUN SERPL-MCNC: 7 MG/DL (ref 7–22)
CALCIUM SERPL-MCNC: 9.4 MG/DL (ref 8.5–10.5)
CHLORIDE SERPL-SCNC: 101 MEQ/L (ref 98–111)
CHOLEST SERPL-MCNC: 141 MG/DL (ref 100–199)
CO2 SERPL-SCNC: 25 MEQ/L (ref 23–33)
CREAT SERPL-MCNC: 0.7 MG/DL (ref 0.4–1.2)
DEPRECATED RDW RBC AUTO: 38.1 FL (ref 35–45)
EOSINOPHIL NFR BLD AUTO: 0.7 %
EOSINOPHILS ABSOLUTE: 0.1 THOU/MM3 (ref 0–0.4)
ERYTHROCYTE [DISTWIDTH] IN BLOOD BY AUTOMATED COUNT: 11.6 % (ref 11.5–14.5)
GFR SERPL CREATININE-BSD FRML MDRD: > 90 ML/MIN/1.73M2
GLUCOSE SERPL-MCNC: 224 MG/DL (ref 70–108)
HCT VFR BLD AUTO: 39.3 % (ref 37–47)
HDLC SERPL-MCNC: 63 MG/DL
HGB BLD-MCNC: 13.5 GM/DL (ref 12–16)
IMM GRANULOCYTES # BLD AUTO: 0.02 THOU/MM3 (ref 0–0.07)
IMM GRANULOCYTES NFR BLD AUTO: 0.2 %
LDLC SERPL CALC-MCNC: 70 MG/DL
LYMPHOCYTES ABSOLUTE: 1.8 THOU/MM3 (ref 1–4.8)
LYMPHOCYTES NFR BLD AUTO: 20.4 %
MCH RBC QN AUTO: 31.1 PG (ref 26–33)
MCHC RBC AUTO-ENTMCNC: 34.4 GM/DL (ref 32.2–35.5)
MCV RBC AUTO: 90.6 FL (ref 81–99)
MONOCYTES ABSOLUTE: 0.4 THOU/MM3 (ref 0.4–1.3)
MONOCYTES NFR BLD AUTO: 4.2 %
NEUTROPHILS ABSOLUTE: 6.6 THOU/MM3 (ref 1.8–7.7)
NEUTROPHILS NFR BLD AUTO: 74.3 %
NRBC BLD AUTO-RTO: 0 /100 WBC
PLATELET # BLD AUTO: 321 THOU/MM3 (ref 130–400)
PMV BLD AUTO: 11.3 FL (ref 9.4–12.4)
POTASSIUM SERPL-SCNC: 4.7 MEQ/L (ref 3.5–5.2)
PROT SERPL-MCNC: 6.7 G/DL (ref 6.1–8)
RBC # BLD AUTO: 4.34 MILL/MM3 (ref 4.2–5.4)
SODIUM SERPL-SCNC: 135 MEQ/L (ref 135–145)
TRIGL SERPL-MCNC: 39 MG/DL (ref 0–199)
WBC # BLD AUTO: 8.9 THOU/MM3 (ref 4.8–10.8)

## 2024-06-29 PROCEDURE — 80053 COMPREHEN METABOLIC PANEL: CPT

## 2024-06-29 PROCEDURE — 85025 COMPLETE CBC W/AUTO DIFF WBC: CPT

## 2024-06-29 PROCEDURE — 80061 LIPID PANEL: CPT

## 2024-06-29 PROCEDURE — 36415 COLL VENOUS BLD VENIPUNCTURE: CPT

## 2024-07-01 ENCOUNTER — TELEPHONE (OUTPATIENT)
Dept: INTERNAL MEDICINE CLINIC | Age: 29
End: 2024-07-01

## 2024-07-01 ENCOUNTER — OFFICE VISIT (OUTPATIENT)
Dept: INTERNAL MEDICINE CLINIC | Age: 29
End: 2024-07-01
Payer: MEDICARE

## 2024-07-01 VITALS
TEMPERATURE: 98 F | HEIGHT: 67 IN | WEIGHT: 180.4 LBS | SYSTOLIC BLOOD PRESSURE: 124 MMHG | DIASTOLIC BLOOD PRESSURE: 64 MMHG | HEART RATE: 74 BPM | BODY MASS INDEX: 28.31 KG/M2

## 2024-07-01 DIAGNOSIS — E10.8 TYPE 1 DIABETES MELLITUS WITH COMPLICATION, WITH LONG-TERM CURRENT USE OF INSULIN (HCC): Primary | ICD-10-CM

## 2024-07-01 LAB — HBA1C MFR BLD: 6.2 % (ref 4.3–5.7)

## 2024-07-01 PROCEDURE — 99999 PR OFFICE/OUTPT VISIT,PROCEDURE ONLY: CPT | Performed by: REGISTERED NURSE

## 2024-07-01 PROCEDURE — G0108 DIAB MANAGE TRN  PER INDIV: HCPCS | Performed by: REGISTERED NURSE

## 2024-07-01 PROCEDURE — 83036 HEMOGLOBIN GLYCOSYLATED A1C: CPT | Performed by: REGISTERED NURSE

## 2024-07-01 NOTE — PATIENT INSTRUCTIONS
Basaglar 18 units in the morning and 11 units in the          Evening  Try to eat breakfast every day. Try 10-30 grams carbohydrate       With protein  Continue to take Novolog before you eat each meal        Using your carb ratio  Try doing yoga or stretching/ breathing exercises in         The evening for 10 minutes to help keep your           Blood sugar down overnight  Ask Melia about getting a urine protein at the next         Office appointment

## 2024-07-01 NOTE — TELEPHONE ENCOUNTER
Diabetes Pharmacotherapy:  Basaglar 20 units in AM and 10 units at bedtime  Novolog 1:4 carb ratio bkfst, 1:6 carb ratio lunch and dinner; 1:8 bedtime     Glucose Trends:   Glucose at 3 hrs PPD today resulted at 79mg/dl  Current monitoring regimen: Dexcom CGM - checks 4+ times daily  Home blood sugar trends:               -V. High: 3%, High: 28%, Target: 66%, Low: 2%, V. Low: 1%              -Average Glucose: 151mg/dL; GMI: 6.9%;  %time CGM active 93%              -BG trends up over night; elevated after breakfast  Any episodes of hypoglycemia? yes - 3-4 times per weej; during the days 3-4 hrpp    REINIER Diaz CNP,   Please authorize the Diabetes Clinic at [facility] to teach/ assist Meeta to decrease her morning Basglar to 18 units and increase her evening Basaglar to 11 units.   If you agree, please note and return. Thank you.    Also, please obtain a POCT MA with her next appt in your office or order a urine MA to be completed for Meeta. Thank you!

## 2024-07-01 NOTE — PROGRESS NOTES
[facility]  [address] [place] [address]  [phone] (phone)  [phone] (fax)    Patient ID: Meeta Mcdowell 1995  Referring Provider: REINIER Randall CNP     Patient's name and  were verified.    Subjective:    She presents for a follow-up diabetic visit. She has type 1 diabetes mellitus. She is compliant some of the time.  Assessment:     Lab Results   Component Value Date/Time    LABA1C 6.2 2024 10:43 AM    LABA1C 7.4 2020 08:40 AM    BUN 7 2024 10:01 AM    CREATININE 0.7 2024 10:01 AM     Vitals:    24 1801   BP: 124/64   Site: Right Upper Arm   Position: Sitting   Pulse: 74   Temp: 98 °F (36.7 °C)   Weight: 81.8 kg (180 lb 6.4 oz)   Height: 1.702 m (5' 7\")     Wt Readings from Last 3 Encounters:   24 81.8 kg (180 lb 6.4 oz)   24 79.4 kg (175 lb)   23 82.8 kg (182 lb 9.6 oz)     Ht Readings from Last 3 Encounters:   24 1.702 m (5' 7\")   24 1.702 m (5' 7\")   23 1.702 m (5' 7\")     Est, Glom Filt Rate   Date Value Ref Range Status   2024 > 90 >60 ml/min/1.73m2 Final     Diabetes Pharmacotherapy:  Basaglar 20 units in AM and 10 units at bedtime  Novolog 1:4 carb ratio bkfst, 1:6 carb ratio lunch and dinner; 1:8 bedtime    Glucose Trends:   Glucose at 3 hrs PPD today resulted at 79mg/dl  Current monitoring regimen: Dexcom CGM - checks 4+ times daily  Home blood sugar trends:    -V. High: 3%, High: 28%, Target: 66%, Low: 2%, V. Low: 1%   -Average Glucose: 151mg/dL; GMI: 6.9%;  %time CGM active 93%              -BG trends up over night; elevated after breakfast  Any episodes of hypoglycemia? yes - 3-4 times per weej; during the days 3-4 hrpp   -Treats with juice and crackers    Lifestyle Factors:   Previous visit with dietician:   Current diet: B: skips            L: meat/ veggies                       D: pork/ potato salad/ salad                       Snacks: bedtime 10 grams at bedtime/ protein

## 2024-07-02 NOTE — TELEPHONE ENCOUNTER
I authorize the [facility] at [place] to teach/ assist Meeta to decrease her morning Basglar to 18 units and increase her evening Basaglar to 11 units.     Thank you.  Melia Diaz, CNP

## 2024-07-02 NOTE — TELEPHONE ENCOUNTER
Meeta instructed to decrease her morning Basglar to 18 units and increase her evening Basaglar to 11 units. Meeta and mom voiced understanding of this change via teach back.

## 2024-10-02 ENCOUNTER — OFFICE VISIT (OUTPATIENT)
Dept: INTERNAL MEDICINE CLINIC | Age: 29
End: 2024-10-02
Payer: MEDICARE

## 2024-10-02 VITALS
HEART RATE: 84 BPM | WEIGHT: 179 LBS | BODY MASS INDEX: 28.09 KG/M2 | HEIGHT: 67 IN | DIASTOLIC BLOOD PRESSURE: 72 MMHG | SYSTOLIC BLOOD PRESSURE: 116 MMHG | TEMPERATURE: 98.4 F

## 2024-10-02 DIAGNOSIS — E10.8 TYPE 1 DIABETES MELLITUS WITH COMPLICATION, WITH LONG-TERM CURRENT USE OF INSULIN (HCC): Primary | ICD-10-CM

## 2024-10-02 LAB — HBA1C MFR BLD: 6.4 % (ref 4.3–5.7)

## 2024-10-02 PROCEDURE — 99999 PR OFFICE/OUTPT VISIT,PROCEDURE ONLY: CPT | Performed by: REGISTERED NURSE

## 2024-10-02 PROCEDURE — 83036 HEMOGLOBIN GLYCOSYLATED A1C: CPT | Performed by: REGISTERED NURSE

## 2024-10-02 NOTE — PATIENT INSTRUCTIONS
Carb ratio with meals  try 1 unit per 5 grams of carb           -bolus 10-15 minutes before eating          -if low before eating --use fast glucose (maple syrup) 1 T.                           Then bolus before you eat  If your blood sugar is still over 200 1 hour after eating --use only 2 units               To avoid lows 3-4 hours later  Lantus 12 units at bedtime  Make sure you get that 10grams of carbohydrate at bedtime            Snack of 1/2 of your protein plus shake  Stay active! Try to walk every day--even if your blood sugar isn't high

## 2024-10-02 NOTE — PROGRESS NOTES
[facility]  [address] [place] [address]  [phone] (phone)  [phone] (fax)    Patient ID: Meeta Mcdowell 1995  Referring Provider: REINIER Diaz     Patient's name and  were verified.    Subjective:    She presents for a follow-up diabetic visit. She has type 1 diabetes mellitus. She is compliant some of the time.  Assessment:     Lab Results   Component Value Date/Time    LABA1C 6.4 10/02/2024 09:36 AM    LABA1C 7.4 2020 08:40 AM    BUN 7 2024 10:01 AM    CREATININE 0.7 2024 10:01 AM     Vitals:    10/02/24 1226   BP: 116/72   Site: Left Upper Arm   Position: Sitting   Pulse: 84   Temp: 98.4 °F (36.9 °C)   Weight: 81.2 kg (179 lb)   Height: 1.702 m (5' 7\")     Wt Readings from Last 3 Encounters:   10/02/24 81.2 kg (179 lb)   24 81.8 kg (180 lb 6.4 oz)   24 79.4 kg (175 lb)     Ht Readings from Last 3 Encounters:   10/02/24 1.702 m (5' 7\")   24 1.702 m (5' 7\")   24 1.702 m (5' 7\")     Est, Glom Filt Rate   Date Value Ref Range Status   2024 > 90 >60 ml/min/1.73m2 Final   2023 >60 >60 ml/min/1.73m2 Final     Diabetes Pharmacotherapy:  Basaglar 18 units in AM and 11 units at night  Novolog 1:5 carb ratio at all meals; 1:8 carb ratio for bedtime snack )rarely uses)                      Plus froup 2 scale      Glucose Trends:   Current monitoring regimen: Dexcom CGM - checks 4+ times daily  Home blood sugar trends:    -V. High: 6%, High: 27%, Target: 64%, Low: 2%, V. Low: 1%   -Average Glucose: 155mg/dL; GMI: 7.0%;  %time CGM active 93%              -Glucose levels trend up overnight, but a history of lows 12-2am. Poor meal clearance. Taking 3 units Novolog 1hrpp-resulting in lows 3-4 hrspp  Any episodes of hypoglycemia? yes - 2 lows per day; 3-4 hours after meals   -Treats with maple syrup    Lifestyle Factors:   Previous visit with dietician: 2018  Current diet: B: skips                       10am protein or 4 crackers

## 2024-11-01 ENCOUNTER — TELEPHONE (OUTPATIENT)
Dept: INTERNAL MEDICINE CLINIC | Age: 29
End: 2024-11-01

## 2024-11-01 NOTE — TELEPHONE ENCOUNTER
Diabetes Pharmacotherapy:  Basaglar 18 units in AM and 11 units at night  Novolog 1:5 carb ratio at all meals; 1:8 carb ratio for bedtime snack )rarely uses)                      Plus froup 2 scale        Glucose Trends: from 10/2/2024  Current monitoring regimen: Dexcom CGM - checks 4+ times daily  Home blood sugar trends:               -V. High: 6%, High: 27%, Target: 64%, Low: 2%, V. Low: 1%              -Average Glucose: 155mg/dL; GMI: 7.0%;  %time CGM active 93%              -Glucose levels trend up overnight, but a history of lows 12-2am. Poor meal clearance. Taking 3 units Novolog 1hrpp-resulting in lows 3-4 hrspp  Any episodes of hypoglycemia? yes - 2 lows per day; 3-4 hours after meals    REINIER Diaz CNP  Please authorize the [facility] at [facility] to teach/ assist Meeta to make the following changes in her insulin dosing:     Roly Lantus 12 units at bedtime   Increase Carb ratio with all meals to 1 unit per 5 grams of carb     If you agree, please note and return. Thank you.

## 2024-11-04 NOTE — TELEPHONE ENCOUNTER
Meeta and her mom instructed to Increase Lantus 12 units at bedtime and to increase Carb ratio with all meals to 1 unit per 5 grams of carb. Meeta and her mom voiced understanding of these insulin dose changes via teach back.

## 2025-02-18 ENCOUNTER — TELEPHONE (OUTPATIENT)
Dept: INTERNAL MEDICINE CLINIC | Age: 30
End: 2025-02-18

## 2025-02-18 NOTE — TELEPHONE ENCOUNTER
I authorize the [facility] at [facility] to teach/ assist Meeta and her mom increase Lantus dose at bedtime from 12 to 13 units and to decrease the carb ratio for bedtime snack from 1:12 grams to 1:15 grams.     Melia Diaz, CNP

## 2025-02-18 NOTE — TELEPHONE ENCOUNTER
Mom instructed on insulin changes: Lantus dose at bedtime from 12 to 13 units and to decrease the carb ratio for bedtime snack from 1:12 grams to 1:15 grams as authorized. Mom voiced understanding via teach back.

## 2025-02-18 NOTE — TELEPHONE ENCOUNTER
Cancelled appointment today r/t cold and fear of black ice on off roads.  Estela is concerned that Meeta's blood sugars are going up overnight. Often not r/t bedtime snack. *if bedtime snack is eaten and Novolog is taken, she will have lows before midnight.    Discussed decreasing Novolog carb ratio for bedtime snack             1:12 grams --> 1:15 grams       And increasing Lantus at bedtime from 12 to 13 units   Also, asking about taking apple cider vinegar capsules--there is no         Evidence that this can help with glucose control, but if desired         There are not C/I noted  Follow up with DM clinic 3/20/25    REINIER Diaz CNP,     Please authorize the [facility] at [facility] to teach/ assist Meeta and her mom increase Lantus dose at bedtime from 12 to 13 units and to decrease the carb ratio for bedtime snack from 1:12 grams to 1:15 grams.      If you agree, please note and return. Thank you.

## 2025-03-20 ENCOUNTER — OFFICE VISIT (OUTPATIENT)
Dept: INTERNAL MEDICINE CLINIC | Age: 30
End: 2025-03-20
Payer: MEDICARE

## 2025-03-20 VITALS
TEMPERATURE: 98.6 F | SYSTOLIC BLOOD PRESSURE: 128 MMHG | BODY MASS INDEX: 29.98 KG/M2 | WEIGHT: 191 LBS | HEART RATE: 76 BPM | DIASTOLIC BLOOD PRESSURE: 80 MMHG | HEIGHT: 67 IN

## 2025-03-20 DIAGNOSIS — E10.8 TYPE 1 DIABETES MELLITUS WITH COMPLICATION, WITH LONG-TERM CURRENT USE OF INSULIN (HCC): Primary | ICD-10-CM

## 2025-03-20 LAB — HBA1C MFR BLD: 6.9 % (ref 4.3–5.7)

## 2025-03-20 PROCEDURE — 83036 HEMOGLOBIN GLYCOSYLATED A1C: CPT | Performed by: REGISTERED NURSE

## 2025-03-20 PROCEDURE — G0108 DIAB MANAGE TRN  PER INDIV: HCPCS | Performed by: REGISTERED NURSE

## 2025-03-20 RX ORDER — INSULIN LISPRO 100 [IU]/ML
INJECTION, SOLUTION INTRAVENOUS; SUBCUTANEOUS
COMMUNITY
Start: 2025-01-18

## 2025-03-20 ASSESSMENT — PATIENT HEALTH QUESTIONNAIRE - PHQ9: DEPRESSION UNABLE TO ASSESS: PT REFUSES

## 2025-03-20 NOTE — PROGRESS NOTES
[facility]  [address] [place] [address]  [phone] (phone)  [phone] (fax)    Patient ID: Meeta Mcdowell 1995  Referring Provider: REINIER Diaz CNP    Patient's name and  were verified.    Subjective:    She presents for a follow-up diabetic visit. She has type 1 diabetes mellitus. She is compliant some of the time.  Assessment:     Lab Results   Component Value Date/Time    LABA1C 6.9 2025 10:11 AM    LABA1C 7.4 2020 08:40 AM    BUN 7 2024 10:01 AM    CREATININE 0.7 2024 10:01 AM     Vitals:    25 1051 25 1219   BP: (!) 141/72 128/80   BP Site: Right Upper Arm Right Upper Arm   Patient Position: Sitting Sitting   Pulse: 76    Temp: 98.6 °F (37 °C)    Weight: 86.6 kg (191 lb)    Height: 1.702 m (5' 7\")      Wt Readings from Last 3 Encounters:   25 86.6 kg (191 lb)   10/02/24 81.2 kg (179 lb)   24 81.8 kg (180 lb 6.4 oz)     Ht Readings from Last 3 Encounters:   25 1.702 m (5' 7\")   10/02/24 1.702 m (5' 7\")   24 1.702 m (5' 7\")     Est, Glom Filt Rate   Date Value Ref Range Status   2024 > 90 >60 ml/min/1.73m2 Final   2023 >60 >60 ml/min/1.73m2 Final     Diabetes Pharmacotherapy:  Glargine 18units in AM and 13units bedtime  Lispro 1:5grams carb; 1:8 grams carb bedtime snack. Plus group 2 scale    Glucose Trends:   Glucose at FBS  today resulted at 80mg/dl  Current monitoring regimen: Dexcom CGM - checks 4+ times daily  Home blood sugar trends:    -V. High: 19%, High: 27%, Target: 53%, Low: 1%, V. Low: 1%   -Average Glucose: 184mg/dL; GMI: 7.7%;  %time CGM active 95%              -glucose levels quite variable; glucose excursions with meals and lows 3-4hrpp. Glucose levels trend up overnight  Any episodes of hypoglycemia? yes - 2-3 times per week; highest risk after supper   -Treats with 2 crackers and protein    Lifestyle Factors:   Previous visit with dietician: 2021  Current diet: B: skips if BS is high

## 2025-03-20 NOTE — PATIENT INSTRUCTIONS
Always take your mealtime insulin before you eat       -if blood sugar is above 120 before meal= take your insulin               15-20 minutes before eating           --under 120 take closer to your 5-10 minutes           Under 90 -take 1 glucose tablet and take your insulin right                    Before you eat -5 minutes  Try using 1 unit per 6 grams carb at your evening meal  Cut back your morning correction with mealtime insulin to 6 units  Lantus  16 units in the morning and 15 units units at bedtime  Bedtime --if blood sugar is less than 80 try 1 glucose tablet and              protein

## 2025-06-25 ENCOUNTER — OFFICE VISIT (OUTPATIENT)
Dept: INTERNAL MEDICINE CLINIC | Age: 30
End: 2025-06-25

## 2025-06-25 DIAGNOSIS — E10.8 TYPE 1 DIABETES MELLITUS WITH COMPLICATION, WITH LONG-TERM CURRENT USE OF INSULIN (HCC): Primary | ICD-10-CM

## 2025-06-25 LAB — HBA1C MFR BLD: 6.5 % (ref 4.3–5.7)

## 2025-06-25 NOTE — PROGRESS NOTES
[facility]  [address] [place] [address]  [phone] (phone)  [phone] (fax)    Patient ID: Meeta Mcdowell 1995  Referring Provider: REINIER Diaz     Patient's name and  were verified.    Subjective:    She presents for a follow-up diabetic visit. She has type 1 diabetes mellitus. She is compliant some of the time.  Assessment:     Lab Results   Component Value Date/Time    LABA1C 6.5 2025 10:08 AM    LABA1C 7.4 2020 08:40 AM    BUN 9 2025 08:44 AM    CREATININE 0.8 2025 08:44 AM     Vitals:    25 1828   BP: 116/73   Pulse: 84   Temp: 98.7 °F (37.1 °C)   Weight: 83.6 kg (184 lb 6.4 oz)     Wt Readings from Last 3 Encounters:   25 83.6 kg (184 lb 6.4 oz)   25 86.2 kg (190 lb)   25 86.6 kg (191 lb)     Ht Readings from Last 3 Encounters:   25 1.753 m (5' 9\")   25 1.702 m (5' 7\")   10/02/24 1.702 m (5' 7\")     Est, Glom Filt Rate   Date Value Ref Range Status   2024 > 90 >60 ml/min/1.73m2 Final       Diabetes Pharmacotherapy:  Glargine 15 units in AM and 15 units bedtime  Lispro 1:5grams carb; 1:8 grams carb bedtime snack. Plus group 2 scale    Glucose Trends:   Current monitoring regimen: Dexcom CGM - checks 4+ times daily  Home blood sugar trends:    -V. High: 10%, High: 27%, Target: 62%, Low: 1%, V. Low: <1%   -Average Glucose: 169mg/dL; GMI: 7.4%;  %time CGM active 95%  Any episodes of hypoglycemia? yes - 1-2x weekly    Lifestyle Factors:   Previous visit with dietician: yes -   Current diet: B: skips OR             L: roast beef w/ carrots                        D: roast (beef/carrot/potato)+ salad + naan                       Snacks: cherries OR strawberries                       Beverages:   Current exercise: walking around the house, some gardening    Health Maintenance:  Diabetes Management   Topic Date Due    Diabetic retinal exam  2023    Diabetic Alb to Cr ratio (uACR) test  2023    Diabetic

## 2025-06-25 NOTE — PATIENT INSTRUCTIONS
Avoid skipping breakfast - eat protein instead    2. Try to increase your exercise - 15 minutes daily   -Squats/lunges/sit-ups/knee push ups (20 sets of each, rotate through)   -Arm stretches, leg lifts    3. If your sugar is low before a meal - treat the low first, then dose insulin for your meal

## 2025-06-26 VITALS
WEIGHT: 184.4 LBS | DIASTOLIC BLOOD PRESSURE: 73 MMHG | SYSTOLIC BLOOD PRESSURE: 116 MMHG | HEART RATE: 84 BPM | TEMPERATURE: 98.7 F | BODY MASS INDEX: 27.23 KG/M2

## 2025-06-27 ENCOUNTER — HOSPITAL ENCOUNTER (OUTPATIENT)
Age: 30
Discharge: HOME | End: 2025-06-27
Payer: MEDICARE

## 2025-06-27 DIAGNOSIS — E10.8 TYPE 1 DIABETES MELLITUS WITH COMPLICATION, WITH LONG-TERM CURRENT USE OF INSULIN (HCC): ICD-10-CM

## 2025-06-27 DIAGNOSIS — I10 HYPERTENSION, UNSPECIFIED TYPE: ICD-10-CM

## 2025-06-27 DIAGNOSIS — Z13.220 LIPID SCREENING: ICD-10-CM

## 2025-06-27 DIAGNOSIS — N32.81 OVERACTIVE BLADDER: ICD-10-CM

## 2025-06-27 LAB
ALBUMIN SERPL BCG-MCNC: 4.3 G/DL (ref 3.4–4.9)
ALP SERPL-CCNC: 65 U/L (ref 38–126)
ALT SERPL W/O P-5'-P-CCNC: 11 U/L (ref 10–35)
ANION GAP SERPL CALC-SCNC: 11 MEQ/L (ref 8–16)
AST SERPL-CCNC: 15 U/L (ref 10–35)
BASOPHILS ABSOLUTE: 0.1 THOU/MM3 (ref 0–0.1)
BASOPHILS NFR BLD AUTO: 0.6 %
BILIRUB SERPL-MCNC: 0.7 MG/DL (ref 0.3–1.2)
BUN SERPL-MCNC: 9 MG/DL (ref 8–23)
CALCIUM SERPL-MCNC: 9.8 MG/DL (ref 8.6–10)
CHLORIDE SERPL-SCNC: 101 MEQ/L (ref 98–111)
CHOLEST SERPL-MCNC: 157 MG/DL (ref 100–199)
CO2 SERPL-SCNC: 23 MEQ/L (ref 22–29)
CREAT SERPL-MCNC: 0.8 MG/DL (ref 0.5–0.9)
DEPRECATED RDW RBC AUTO: 38.4 FL (ref 35–45)
EOSINOPHIL NFR BLD AUTO: 1.6 %
EOSINOPHILS ABSOLUTE: 0.1 THOU/MM3 (ref 0–0.4)
ERYTHROCYTE [DISTWIDTH] IN BLOOD BY AUTOMATED COUNT: 11.9 % (ref 11.5–14.5)
GFR SERPL CREATININE-BSD FRML MDRD: > 90 ML/MIN/1.73M2
GLUCOSE SERPL-MCNC: 169 MG/DL (ref 74–109)
HCT VFR BLD AUTO: 42.5 % (ref 37–47)
HDLC SERPL-MCNC: 49 MG/DL
HGB BLD-MCNC: 14.3 GM/DL (ref 12–16)
IMM GRANULOCYTES # BLD AUTO: 0.02 THOU/MM3 (ref 0–0.07)
IMM GRANULOCYTES NFR BLD AUTO: 0.2 %
LDLC SERPL CALC-MCNC: 99 MG/DL
LYMPHOCYTES ABSOLUTE: 2.4 THOU/MM3 (ref 1–4.8)
LYMPHOCYTES NFR BLD AUTO: 28 %
MCH RBC QN AUTO: 29.8 PG (ref 26–33)
MCHC RBC AUTO-ENTMCNC: 33.6 GM/DL (ref 32.2–35.5)
MCV RBC AUTO: 88.5 FL (ref 81–99)
MONOCYTES ABSOLUTE: 0.4 THOU/MM3 (ref 0.4–1.3)
MONOCYTES NFR BLD AUTO: 4.7 %
NEUTROPHILS ABSOLUTE: 5.6 THOU/MM3 (ref 1.8–7.7)
NEUTROPHILS NFR BLD AUTO: 64.9 %
NRBC BLD AUTO-RTO: 0 /100 WBC
PLATELET # BLD AUTO: 339 THOU/MM3 (ref 130–400)
PMV BLD AUTO: 11.2 FL (ref 9.4–12.4)
POTASSIUM SERPL-SCNC: 4.2 MEQ/L (ref 3.5–5.2)
PROT SERPL-MCNC: 7 G/DL (ref 6.4–8.3)
RBC # BLD AUTO: 4.8 MILL/MM3 (ref 4.2–5.4)
SODIUM SERPL-SCNC: 135 MEQ/L (ref 135–145)
TRIGL SERPL-MCNC: 45 MG/DL (ref 0–199)
WBC # BLD AUTO: 8.7 THOU/MM3 (ref 4.8–10.8)

## 2025-06-27 PROCEDURE — 80061 LIPID PANEL: CPT

## 2025-06-27 PROCEDURE — 85025 COMPLETE CBC W/AUTO DIFF WBC: CPT

## 2025-06-27 PROCEDURE — 80053 COMPREHEN METABOLIC PANEL: CPT

## 2025-06-27 PROCEDURE — 36415 COLL VENOUS BLD VENIPUNCTURE: CPT
